# Patient Record
Sex: MALE | Race: WHITE | NOT HISPANIC OR LATINO | Employment: FULL TIME | URBAN - METROPOLITAN AREA
[De-identification: names, ages, dates, MRNs, and addresses within clinical notes are randomized per-mention and may not be internally consistent; named-entity substitution may affect disease eponyms.]

---

## 2018-02-22 ENCOUNTER — OFFICE VISIT (OUTPATIENT)
Dept: INTERNAL MEDICINE | Facility: CLINIC | Age: 48
End: 2018-02-22
Payer: COMMERCIAL

## 2018-02-22 VITALS
TEMPERATURE: 99 F | HEART RATE: 92 BPM | WEIGHT: 200.63 LBS | DIASTOLIC BLOOD PRESSURE: 81 MMHG | SYSTOLIC BLOOD PRESSURE: 131 MMHG | BODY MASS INDEX: 31.49 KG/M2 | HEIGHT: 67 IN | RESPIRATION RATE: 18 BRPM

## 2018-02-22 DIAGNOSIS — B96.89 BACTERIAL INFECTION OF SKIN: ICD-10-CM

## 2018-02-22 DIAGNOSIS — L08.9 BACTERIAL INFECTION OF SKIN: ICD-10-CM

## 2018-02-22 DIAGNOSIS — F17.200 TOBACCO DEPENDENCE: ICD-10-CM

## 2018-02-22 DIAGNOSIS — L30.9 ECZEMA, UNSPECIFIED TYPE: Primary | ICD-10-CM

## 2018-02-22 PROCEDURE — 99999 PR PBB SHADOW E&M-NEW PATIENT-LVL III: CPT | Mod: PBBFAC,,, | Performed by: INTERNAL MEDICINE

## 2018-02-22 PROCEDURE — 99204 OFFICE O/P NEW MOD 45 MIN: CPT | Mod: 25,S$GLB,, | Performed by: INTERNAL MEDICINE

## 2018-02-22 PROCEDURE — 96372 THER/PROPH/DIAG INJ SC/IM: CPT | Mod: S$GLB,,, | Performed by: INTERNAL MEDICINE

## 2018-02-22 PROCEDURE — 3008F BODY MASS INDEX DOCD: CPT | Mod: S$GLB,,, | Performed by: INTERNAL MEDICINE

## 2018-02-22 RX ORDER — TRIAMCINOLONE ACETONIDE 40 MG/ML
40 INJECTION, SUSPENSION INTRA-ARTICULAR; INTRAMUSCULAR
Status: COMPLETED | OUTPATIENT
Start: 2018-02-22 | End: 2018-02-22

## 2018-02-22 RX ORDER — TRIAMCINOLONE ACETONIDE 1 MG/G
CREAM TOPICAL 2 TIMES DAILY
Qty: 454 G | Refills: 0 | Status: SHIPPED | OUTPATIENT
Start: 2018-02-22 | End: 2018-03-13

## 2018-02-22 RX ORDER — PREDNISONE 20 MG/1
TABLET ORAL
Qty: 20 TABLET | Refills: 0 | Status: SHIPPED | OUTPATIENT
Start: 2018-02-22 | End: 2018-03-13

## 2018-02-22 RX ORDER — DOXYCYCLINE 100 MG/1
100 CAPSULE ORAL 2 TIMES DAILY
Qty: 20 CAPSULE | Refills: 0 | Status: SHIPPED | OUTPATIENT
Start: 2018-02-22 | End: 2018-03-13

## 2018-02-22 RX ADMIN — TRIAMCINOLONE ACETONIDE 40 MG: 40 INJECTION, SUSPENSION INTRA-ARTICULAR; INTRAMUSCULAR at 02:02

## 2018-02-22 NOTE — PROGRESS NOTES
Subjective:       Patient ID: Cory Prather is a 48 y.o. male.    Chief Complaint: Eczema    HPI   Pt with chronic eczema is here for evaluation of worsening diffuse rashes on his trunk, arms and legs described as itchy in nature associated with excoriations and mild discharge from a few of the areas. No fevers/chills. He has had some relief in the past with steroids.   Review of Systems   Constitutional: Negative for activity change, appetite change, chills, diaphoresis, fatigue, fever and unexpected weight change.   HENT: Negative for postnasal drip, rhinorrhea, sinus pressure, sneezing, sore throat, trouble swallowing and voice change.    Respiratory: Negative for cough, shortness of breath and wheezing.    Cardiovascular: Negative for chest pain, palpitations and leg swelling.   Gastrointestinal: Negative for abdominal pain, blood in stool, constipation, diarrhea, nausea and vomiting.   Genitourinary: Negative for dysuria.   Musculoskeletal: Negative for arthralgias and myalgias.   Skin: Positive for rash. Negative for wound.   Allergic/Immunologic: Negative for environmental allergies and food allergies.   Hematological: Negative for adenopathy. Does not bruise/bleed easily.       Objective:      Physical Exam   Constitutional: He is oriented to person, place, and time. He appears well-developed and well-nourished. No distress.   HENT:   Head: Normocephalic and atraumatic.   Eyes: Conjunctivae and EOM are normal. Pupils are equal, round, and reactive to light. Right eye exhibits no discharge. Left eye exhibits no discharge. No scleral icterus.   Neck: Normal range of motion. Neck supple. No JVD present.   Cardiovascular: Normal rate, regular rhythm and normal heart sounds.    No murmur heard.  Pulmonary/Chest: Effort normal and breath sounds normal. No respiratory distress. He has no wheezes. He has no rales.   Musculoskeletal: He exhibits no edema.   Lymphadenopathy:     He has no cervical adenopathy.    Neurological: He is alert and oriented to person, place, and time.   Skin: Skin is warm and dry. Rash (with excoriations and scaling/crusting) noted. He is not diaphoretic. There is erythema. No pallor.            Assessment:       1. Eczema, unspecified type    2. Bacterial infection of skin    3. Tobacco dependence        Plan:    1. Kenalog 80 mg IM, Triamcinolone cream BID, Prednisone taper        Referral to Dermatology    2. Rx Doxycycline 100 mg BIDx 10 days 2/2 superimposed bacterial infection from scratching    3. Cessation advised

## 2018-03-13 ENCOUNTER — OFFICE VISIT (OUTPATIENT)
Dept: INTERNAL MEDICINE | Facility: CLINIC | Age: 48
End: 2018-03-13
Payer: COMMERCIAL

## 2018-03-13 VITALS
WEIGHT: 201.06 LBS | HEIGHT: 68 IN | SYSTOLIC BLOOD PRESSURE: 138 MMHG | DIASTOLIC BLOOD PRESSURE: 88 MMHG | HEART RATE: 79 BPM | RESPIRATION RATE: 16 BRPM | BODY MASS INDEX: 30.47 KG/M2 | TEMPERATURE: 98 F

## 2018-03-13 DIAGNOSIS — D56.3 THALASSEMIA MINOR: ICD-10-CM

## 2018-03-13 DIAGNOSIS — Z00.00 ANNUAL PHYSICAL EXAM: Primary | ICD-10-CM

## 2018-03-13 DIAGNOSIS — L30.9 ECZEMA, UNSPECIFIED TYPE: ICD-10-CM

## 2018-03-13 DIAGNOSIS — F17.200 TOBACCO DEPENDENCE: ICD-10-CM

## 2018-03-13 DIAGNOSIS — E66.9 OBESITY (BMI 30-39.9): ICD-10-CM

## 2018-03-13 PROCEDURE — 99999 PR PBB SHADOW E&M-EST. PATIENT-LVL III: CPT | Mod: PBBFAC,,, | Performed by: INTERNAL MEDICINE

## 2018-03-13 PROCEDURE — 90670 PCV13 VACCINE IM: CPT | Mod: S$GLB,,, | Performed by: INTERNAL MEDICINE

## 2018-03-13 PROCEDURE — 99396 PREV VISIT EST AGE 40-64: CPT | Mod: 25,S$GLB,, | Performed by: INTERNAL MEDICINE

## 2018-03-13 PROCEDURE — 90471 IMMUNIZATION ADMIN: CPT | Mod: S$GLB,,, | Performed by: INTERNAL MEDICINE

## 2018-03-13 NOTE — PROGRESS NOTES
Subjective:       Patient ID: Cory Prather is a 48 y.o. male.    Chief Complaint: Annual Exam    HPI   48 y.o. Male here for annual exam.     Cholesterol: (needs)  Vaccines: Influenza (declined); Tetanus (declined)  Eye exam: declined    Exercise: no  Diet: regular    Past Medical History:  No date: Eczema  No date: Obesity (BMI 30-39.9)  No date: Tobacco use  No date: Thalassemia minor  Past Surgical History:  No date: ADENOIDECTOMY  No date: APPENDECTOMY  No date: TONSILLECTOMY  Social History    Marital status: Single              Spouse name:                       Years of education:                 Number of children:               Occupational History  Occupation          Employer            Comment               [email or web address]                             Social History Main Topics    Smoking status: Current Every Day Smoker                                                     Packs/day: 0.00      Years: 0.00           Types: Cigarettes    Smokeless tobacco: Never Used                        Alcohol use: Yes                Comment: social     Drug use: No              Sexual activity: Not Currently     Partners with: Female    Review of patient's allergies indicates:   -- Penicillins -- Rash  Mr. Prather had no medications administered during this visit.    Review of Systems   Constitutional: Negative for activity change, appetite change, chills, diaphoresis, fatigue, fever and unexpected weight change.   HENT: Negative for congestion, mouth sores, postnasal drip, rhinorrhea, sinus pressure, sneezing, sore throat, trouble swallowing and voice change.    Eyes: Negative for discharge, itching and visual disturbance.   Respiratory: Negative for cough, chest tightness, shortness of breath and wheezing.    Cardiovascular: Negative for chest pain, palpitations and leg swelling.   Gastrointestinal: Negative for abdominal pain, blood in stool, constipation, diarrhea, nausea and vomiting.   Endocrine: Negative for  cold intolerance and heat intolerance.   Genitourinary: Negative for difficulty urinating, dysuria, flank pain, hematuria and urgency.   Musculoskeletal: Negative for arthralgias, back pain, myalgias and neck pain.   Skin: Negative for rash and wound.   Allergic/Immunologic: Negative for environmental allergies and food allergies.   Neurological: Negative for dizziness, tremors, seizures, syncope, weakness and headaches.   Hematological: Negative for adenopathy. Does not bruise/bleed easily.   Psychiatric/Behavioral: Negative for confusion, sleep disturbance and suicidal ideas. The patient is not nervous/anxious.        Objective:      Physical Exam   Constitutional: He is oriented to person, place, and time. He appears well-developed and well-nourished. No distress.   HENT:   Head: Normocephalic and atraumatic.   Right Ear: External ear normal.   Left Ear: External ear normal.   Nose: Nose normal.   Mouth/Throat: Oropharynx is clear and moist. No oropharyngeal exudate.   Eyes: Conjunctivae and EOM are normal. Pupils are equal, round, and reactive to light. Right eye exhibits no discharge. Left eye exhibits no discharge. No scleral icterus.   Neck: Normal range of motion. Neck supple. No JVD present. No thyromegaly present.   Cardiovascular: Normal rate, regular rhythm, normal heart sounds and intact distal pulses.    No murmur heard.  Pulmonary/Chest: Effort normal and breath sounds normal. No respiratory distress. He has no wheezes. He has no rales.   Abdominal: Soft. Bowel sounds are normal. He exhibits no distension. There is no tenderness. There is no guarding.   Musculoskeletal: He exhibits no edema.   Lymphadenopathy:     He has no cervical adenopathy.   Neurological: He is alert and oriented to person, place, and time. No cranial nerve deficit. Coordination normal.   Skin: Skin is warm and dry. No rash noted. He is not diaphoretic. No pallor.   Psychiatric: He has a normal mood and affect. Judgment normal.        Assessment:       1. Annual physical exam    2. Tobacco dependence    3. Obesity (BMI 30-39.9)    4. Eczema, unspecified type    5. Thalassemia minor        Plan:    1. Complete blood work, UA       Vaccines: Influenza (declined); Tetanus (declined), PNA (2018)       Eye exam: declined   2. Tobacco use- cessation advised   3. Obesity- pt advised on proper diet/exercise for weight loss   4. Eczema- significant improvement with PO/topical steroids       Pt will f/u with Derm    5. Thalassemia minor   6. F/u in 1 yr

## 2018-03-14 ENCOUNTER — LAB VISIT (OUTPATIENT)
Dept: LAB | Facility: HOSPITAL | Age: 48
End: 2018-03-14
Attending: INTERNAL MEDICINE
Payer: COMMERCIAL

## 2018-03-14 DIAGNOSIS — Z00.00 ANNUAL PHYSICAL EXAM: ICD-10-CM

## 2018-03-14 LAB
ALBUMIN SERPL BCP-MCNC: 3.6 G/DL
ALP SERPL-CCNC: 83 U/L
ALT SERPL W/O P-5'-P-CCNC: 16 U/L
ANION GAP SERPL CALC-SCNC: 8 MMOL/L
AST SERPL-CCNC: 14 U/L
BASOPHILS # BLD AUTO: 0.04 K/UL
BASOPHILS NFR BLD: 0.4 %
BILIRUB SERPL-MCNC: 1.1 MG/DL
BUN SERPL-MCNC: 15 MG/DL
CALCIUM SERPL-MCNC: 9.4 MG/DL
CHLORIDE SERPL-SCNC: 104 MMOL/L
CHOLEST SERPL-MCNC: 209 MG/DL
CHOLEST/HDLC SERPL: 4.9 {RATIO}
CO2 SERPL-SCNC: 26 MMOL/L
COMPLEXED PSA SERPL-MCNC: 0.59 NG/ML
CREAT SERPL-MCNC: 0.8 MG/DL
DIFFERENTIAL METHOD: ABNORMAL
EOSINOPHIL # BLD AUTO: 0.1 K/UL
EOSINOPHIL NFR BLD: 1.1 %
ERYTHROCYTE [DISTWIDTH] IN BLOOD BY AUTOMATED COUNT: 18.7 %
EST. GFR  (AFRICAN AMERICAN): >60 ML/MIN/1.73 M^2
EST. GFR  (NON AFRICAN AMERICAN): >60 ML/MIN/1.73 M^2
ESTIMATED AVG GLUCOSE: 94 MG/DL
GLUCOSE SERPL-MCNC: 97 MG/DL
HBA1C MFR BLD HPLC: 4.9 %
HCT VFR BLD AUTO: 42.8 %
HDLC SERPL-MCNC: 43 MG/DL
HDLC SERPL: 20.6 %
HGB BLD-MCNC: 13.2 G/DL
IMM GRANULOCYTES # BLD AUTO: 0.06 K/UL
IMM GRANULOCYTES NFR BLD AUTO: 0.5 %
LDLC SERPL CALC-MCNC: 148.6 MG/DL
LYMPHOCYTES # BLD AUTO: 2.3 K/UL
LYMPHOCYTES NFR BLD: 20.3 %
MCH RBC QN AUTO: 20.2 PG
MCHC RBC AUTO-ENTMCNC: 30.8 G/DL
MCV RBC AUTO: 65 FL
MONOCYTES # BLD AUTO: 1 K/UL
MONOCYTES NFR BLD: 8.6 %
NEUTROPHILS # BLD AUTO: 7.9 K/UL
NEUTROPHILS NFR BLD: 69.1 %
NONHDLC SERPL-MCNC: 166 MG/DL
NRBC BLD-RTO: 0 /100 WBC
PLATELET # BLD AUTO: 253 K/UL
PMV BLD AUTO: 10.4 FL
POTASSIUM SERPL-SCNC: 4.6 MMOL/L
PROT SERPL-MCNC: 7.3 G/DL
RBC # BLD AUTO: 6.55 M/UL
SODIUM SERPL-SCNC: 138 MMOL/L
TRIGL SERPL-MCNC: 87 MG/DL
TSH SERPL DL<=0.005 MIU/L-ACNC: 1.03 UIU/ML
WBC # BLD AUTO: 11.4 K/UL

## 2018-03-14 PROCEDURE — 84443 ASSAY THYROID STIM HORMONE: CPT

## 2018-03-14 PROCEDURE — 83036 HEMOGLOBIN GLYCOSYLATED A1C: CPT

## 2018-03-14 PROCEDURE — 80053 COMPREHEN METABOLIC PANEL: CPT

## 2018-03-14 PROCEDURE — 36415 COLL VENOUS BLD VENIPUNCTURE: CPT | Mod: PO

## 2018-03-14 PROCEDURE — 85025 COMPLETE CBC W/AUTO DIFF WBC: CPT

## 2018-03-14 PROCEDURE — 80061 LIPID PANEL: CPT

## 2018-03-14 PROCEDURE — 84153 ASSAY OF PSA TOTAL: CPT

## 2018-03-20 ENCOUNTER — INITIAL CONSULT (OUTPATIENT)
Dept: DERMATOLOGY | Facility: CLINIC | Age: 48
End: 2018-03-20
Payer: COMMERCIAL

## 2018-03-20 VITALS — BODY MASS INDEX: 30.56 KG/M2 | WEIGHT: 201 LBS

## 2018-03-20 DIAGNOSIS — L20.84 INTRINSIC ATOPIC DERMATITIS: Primary | ICD-10-CM

## 2018-03-20 PROCEDURE — 99999 PR PBB SHADOW E&M-EST. PATIENT-LVL III: CPT | Mod: PBBFAC,,, | Performed by: DERMATOLOGY

## 2018-03-20 PROCEDURE — 99202 OFFICE O/P NEW SF 15 MIN: CPT | Mod: S$GLB,,, | Performed by: DERMATOLOGY

## 2018-03-20 RX ORDER — FLUOCINONIDE 0.5 MG/G
CREAM TOPICAL 2 TIMES DAILY
COMMUNITY
End: 2018-10-11 | Stop reason: SDUPTHER

## 2018-03-20 NOTE — LETTER
March 20, 2018      Jae Pappas DO  [address] [facility]  [place] [address]           [place] - Dermatology  [address] [facility] [address]  Phone: [phone]  Fax: [phone]          Patient: Cory Prather   MR Number: [id number]   YOB: 1970   Date of Visit: 3/20/2018       Dear Dr. Jae Pappas:    Thank you for referring Cory Prather to me for evaluation. Attached you will find relevant portions of my assessment and plan of care.    If you have questions, please do not hesitate to call me. I look forward to following Cory Prather along with you.    Sincerely,    Jackie Agosto MD    Enclosure  CC:  No Recipients    If you would like to receive this communication electronically, please contact [email or web address] or [phone] to request more information on [employer] [facility] access.    For providers and/or their staff who would like to refer a patient to [facility], please contact us through our one-stop-shop provider referral line, [facility], at [phone].    If you feel you have received this communication in error or would no longer like to receive these types of communications, please e-mail [email or web address]

## 2018-03-20 NOTE — PROGRESS NOTES
"  Subjective:       Patient ID:  Cory Prather is a 48 y.o. male who presents for   Chief Complaint   Patient presents with    Eczema    Dry Skin    Itching     History of Present Illness: The patient presents with chief complaint of rash.  Location: arms and trunk  Duration: off and on for years  Signs/Symptoms: itch    Prior treatments: prednisone and lidex, clear now    2/22:  "Pt with chronic eczema is here for evaluation of worsening diffuse rashes on his trunk, arms and legs described as itchy in nature associated with excoriations and mild discharge from a few of the areas. No fevers/chills. He has had some relief in the past with steroids.    1. Kenalog 80 mg IM, Triamcinolone cream BID, Prednisone taper        Referral to Dermatology    2. Rx Doxycycline 100 mg BIDx 10 days 2/2 superimposed bacterial infection from scratching "          Review of Systems   Constitutional: Negative for fever.   HENT: Negative for congestion, rhinorrhea and postnasal drip.    Eyes: Negative for itching.   Skin: Positive for itching, rash and dry skin.   Allergic/Immunologic: Positive for environmental allergies.        Objective:    Physical Exam   Constitutional: He appears well-developed and well-nourished. No distress.   Neurological: He is alert and oriented to person, place, and time. He is not disoriented.   Psychiatric: He has a normal mood and affect.   Skin:   Areas Examined (abnormalities noted in diagram):   Head / Face Inspection Performed  Neck Inspection Performed  Chest / Axilla Inspection Performed  Back Inspection Performed  RUE Inspected  LUE Inspection Performed              Diagram Legend     Erythematous scaling macule/papule c/w actinic keratosis       Vascular papule c/w angioma      Pigmented verrucoid papule/plaque c/w seborrheic keratosis      Yellow umbilicated papule c/w sebaceous hyperplasia      Irregularly shaped tan macule c/w lentigo     1-2 mm smooth white papules consistent with Milia     "  Movable subcutaneous cyst with punctum c/w epidermal inclusion cyst      Subcutaneous movable cyst c/w pilar cyst      Firm pink to brown papule c/w dermatofibroma      Pedunculated fleshy papule(s) c/w skin tag(s)      Evenly pigmented macule c/w junctional nevus     Mildly variegated pigmented, slightly irregular-bordered macule c/w mildly atypical nevus      Flesh colored to evenly pigmented papule c/w intradermal nevus       Pink pearly papule/plaque c/w basal cell carcinoma      Erythematous hyperkeratotic cursted plaque c/w SCC      Surgical scar with no sign of skin cancer recurrence      Open and closed comedones      Inflammatory papules and pustules      Verrucoid papule consistent consistent with wart     Erythematous eczematous patches and plaques     Dystrophic onycholytic nail with subungual debris c/w onychomycosis     Umbilicated papule    Erythematous-base heme-crusted tan verrucoid plaque consistent with inflamed seborrheic keratosis     Erythematous Silvery Scaling Plaque c/w Psoriasis     See annotation      Assessment / Plan:        Intrinsic atopic dermatitis  Cont lidex prn flares  (see outside records Dr Chen)    cetaphil eczema calming wash and lotion  cerave with pramoxine              Follow-up if symptoms worsen or fail to improve.

## 2018-10-10 ENCOUNTER — TELEPHONE (OUTPATIENT)
Dept: INTERNAL MEDICINE | Facility: CLINIC | Age: 48
End: 2018-10-10

## 2018-10-10 NOTE — TELEPHONE ENCOUNTER
----- Message from Chelo Art sent at 10/9/2018  4:32 PM CDT -----  Contact: Pt's mom Trista   Pt's mom is calling in regards to scheduling an injection appt for eczema for pt.    Pt's mom can be reached at [phone].    Thank you

## 2018-10-11 ENCOUNTER — OFFICE VISIT (OUTPATIENT)
Dept: INTERNAL MEDICINE | Facility: CLINIC | Age: 48
End: 2018-10-11
Payer: COMMERCIAL

## 2018-10-11 VITALS
HEART RATE: 76 BPM | RESPIRATION RATE: 18 BRPM | TEMPERATURE: 99 F | WEIGHT: 196.88 LBS | BODY MASS INDEX: 29.84 KG/M2 | DIASTOLIC BLOOD PRESSURE: 78 MMHG | SYSTOLIC BLOOD PRESSURE: 138 MMHG | HEIGHT: 68 IN

## 2018-10-11 DIAGNOSIS — A49.9 BACTERIAL INFECTION: ICD-10-CM

## 2018-10-11 DIAGNOSIS — L30.9 ECZEMA, UNSPECIFIED TYPE: Primary | ICD-10-CM

## 2018-10-11 DIAGNOSIS — L30.9 ECZEMA, UNSPECIFIED TYPE: ICD-10-CM

## 2018-10-11 PROCEDURE — 99999 PR PBB SHADOW E&M-EST. PATIENT-LVL III: CPT | Mod: PBBFAC,,, | Performed by: INTERNAL MEDICINE

## 2018-10-11 PROCEDURE — 96372 THER/PROPH/DIAG INJ SC/IM: CPT | Mod: S$GLB,,, | Performed by: INTERNAL MEDICINE

## 2018-10-11 PROCEDURE — 99214 OFFICE O/P EST MOD 30 MIN: CPT | Mod: 25,S$GLB,, | Performed by: INTERNAL MEDICINE

## 2018-10-11 PROCEDURE — 3008F BODY MASS INDEX DOCD: CPT | Mod: CPTII,S$GLB,, | Performed by: INTERNAL MEDICINE

## 2018-10-11 RX ORDER — PREDNISONE 20 MG/1
TABLET ORAL
Qty: 20 TABLET | Refills: 0 | Status: SHIPPED | OUTPATIENT
Start: 2018-10-11 | End: 2018-12-11

## 2018-10-11 RX ORDER — DOXYCYCLINE 100 MG/1
100 CAPSULE ORAL 2 TIMES DAILY
Qty: 20 CAPSULE | Refills: 0 | Status: SHIPPED | OUTPATIENT
Start: 2018-10-11 | End: 2018-10-21

## 2018-10-11 RX ORDER — TRIAMCINOLONE ACETONIDE 40 MG/ML
40 INJECTION, SUSPENSION INTRA-ARTICULAR; INTRAMUSCULAR
Status: COMPLETED | OUTPATIENT
Start: 2018-10-11 | End: 2018-10-11

## 2018-10-11 RX ORDER — FLUOCINONIDE 0.5 MG/G
CREAM TOPICAL 2 TIMES DAILY
Qty: 120 G | Refills: 5 | Status: SHIPPED | OUTPATIENT
Start: 2018-10-11 | End: 2021-03-30

## 2018-10-11 RX ADMIN — TRIAMCINOLONE ACETONIDE 40 MG: 40 INJECTION, SUSPENSION INTRA-ARTICULAR; INTRAMUSCULAR at 09:10

## 2018-10-11 NOTE — PROGRESS NOTES
Subjective:       Patient ID: Cory Prather is a 48 y.o. male.    Chief Complaint: Eczema    HPI   Pt here for f/u f/u regarding eczema/dermatitis. His symptoms have been getting worse over the last few months and is diffuse in nature. Minimal relief with steroid cream.   Review of Systems   Constitutional: Negative for activity change, appetite change, chills, diaphoresis, fatigue, fever and unexpected weight change.   HENT: Negative for postnasal drip, rhinorrhea, sinus pressure, sneezing, sore throat, trouble swallowing and voice change.    Respiratory: Negative for cough, shortness of breath and wheezing.    Cardiovascular: Negative for chest pain, palpitations and leg swelling.   Gastrointestinal: Negative for abdominal pain, blood in stool, constipation, diarrhea, nausea and vomiting.   Genitourinary: Negative for dysuria.   Musculoskeletal: Negative for arthralgias and myalgias.   Skin: Positive for rash. Negative for wound.   Allergic/Immunologic: Negative for environmental allergies and food allergies.   Hematological: Negative for adenopathy. Does not bruise/bleed easily.       Objective:      Physical Exam   Constitutional: He is oriented to person, place, and time. He appears well-developed and well-nourished. No distress.   HENT:   Head: Normocephalic and atraumatic.   Eyes: Conjunctivae and EOM are normal. Pupils are equal, round, and reactive to light. Right eye exhibits no discharge. Left eye exhibits no discharge. No scleral icterus.   Neck: Normal range of motion. Neck supple. No JVD present.   Cardiovascular: Normal rate, regular rhythm and normal heart sounds.   No murmur heard.  Pulmonary/Chest: Effort normal and breath sounds normal. No respiratory distress. He has no wheezes. He has no rales.   Musculoskeletal: He exhibits no edema.   Lymphadenopathy:     He has no cervical adenopathy.   Neurological: He is alert and oriented to person, place, and time.   Skin: Skin is warm and dry. Rash  (diffuse, worse on the extremities a/w scaling) noted. Rash is nodular. He is not diaphoretic. There is erythema. No pallor.       Assessment:       1. Eczema, unspecified type    2. Bacterial infection        Plan:    1. Rx Prednisone taper, Triamcinolone cream, Kenalog 80 mg IM        Referral to [facility] for testing    2. Rx Doxycycline

## 2018-10-12 ENCOUNTER — TELEPHONE (OUTPATIENT)
Dept: INTERNAL MEDICINE | Facility: CLINIC | Age: 48
End: 2018-10-12

## 2018-10-12 DIAGNOSIS — L30.9 ECZEMA, UNSPECIFIED TYPE: ICD-10-CM

## 2018-10-12 RX ORDER — TRIAMCINOLONE ACETONIDE 1 MG/G
CREAM TOPICAL
Qty: 454 G | Refills: 0 | Status: SHIPPED | OUTPATIENT
Start: 2018-10-12 | End: 2018-10-12 | Stop reason: SDUPTHER

## 2018-10-12 RX ORDER — TRIAMCINOLONE ACETONIDE 1 MG/G
CREAM TOPICAL
Qty: 800 G | Refills: 0 | Status: SHIPPED | OUTPATIENT
Start: 2018-10-12 | End: 2018-12-11

## 2018-10-12 NOTE — TELEPHONE ENCOUNTER
----- Message from Melony Castro sent at 10/12/2018 11:13 AM CDT -----  Contact: August @ [facility] Direct # [phone],Fax# [phone]  RX request - refill or new RX.  Is this a refill or new RX:  Refill  RX name and strength: triamcinolone acetonide 0.1% (KENALOG) 0.1 % cream  Directions:   Is this a 30 day or 90 day RX:  30  Local pharmacy or mail order pharmacy:  [facility] - [address]  Pharmacy name and phone # Phone # [phone],Fax# [phone]  Comments:  Patient would like to have this medication changed to an 800 gram tube because the script that was requested is unavailable.

## 2018-10-31 ENCOUNTER — OFFICE VISIT (OUTPATIENT)
Dept: ALLERGY | Facility: CLINIC | Age: 48
End: 2018-10-31
Payer: COMMERCIAL

## 2018-10-31 ENCOUNTER — HOSPITAL ENCOUNTER (OUTPATIENT)
Dept: RADIOLOGY | Facility: HOSPITAL | Age: 48
Discharge: HOME | End: 2018-10-31
Attending: ALLERGY & IMMUNOLOGY
Payer: COMMERCIAL

## 2018-10-31 VITALS
DIASTOLIC BLOOD PRESSURE: 76 MMHG | HEART RATE: 74 BPM | HEIGHT: 68 IN | WEIGHT: 192.25 LBS | BODY MASS INDEX: 29.14 KG/M2 | SYSTOLIC BLOOD PRESSURE: 138 MMHG

## 2018-10-31 DIAGNOSIS — R05.9 COUGH: ICD-10-CM

## 2018-10-31 DIAGNOSIS — F17.200 TOBACCO DEPENDENCE: ICD-10-CM

## 2018-10-31 DIAGNOSIS — R05.9 COUGH: Primary | ICD-10-CM

## 2018-10-31 DIAGNOSIS — L30.9 DERMATITIS: ICD-10-CM

## 2018-10-31 PROCEDURE — 71046 X-RAY EXAM CHEST 2 VIEWS: CPT | Mod: 26,,, | Performed by: RADIOLOGY

## 2018-10-31 PROCEDURE — 71046 X-RAY EXAM CHEST 2 VIEWS: CPT | Mod: TC

## 2018-10-31 PROCEDURE — 99244 OFF/OP CNSLTJ NEW/EST MOD 40: CPT | Mod: S$GLB,,, | Performed by: ALLERGY & IMMUNOLOGY

## 2018-10-31 PROCEDURE — 99999 PR PBB SHADOW E&M-EST. PATIENT-LVL III: CPT | Mod: PBBFAC,,, | Performed by: ALLERGY & IMMUNOLOGY

## 2018-10-31 NOTE — LETTER
October 31, 2018      Jae Pappas, DO  [address] [facility] [place] [address]           [facility] - Allergy/ Immunology  [address]  [facility] [address]  Phone: [phone]  Fax: [phone]          Patient: Cory Prather   MR Number: [id number]   YOB: 1970   Date of Visit: 10/31/2018       Dear Dr. Jae Pappas:    Thank you for referring Cory Prather to me for evaluation. Attached you will find relevant portions of my assessment and plan of care.    If you have questions, please do not hesitate to call me. I look forward to following Cory Prather along with you.    Sincerely,    FLORIN Alas III, MD    Enclosure  CC:  No Recipients    If you would like to receive this communication electronically, please contact [email or web address] or [phone] to request more information on [employer] Link access.    For providers and/or their staff who would like to refer a patient to [facility], please contact us through our one-stop-shop provider referral line, [facility], at [phone].    If you feel you have received this communication in error or would no longer like to receive these types of communications, please e-mail [email or web address]

## 2018-10-31 NOTE — PROGRESS NOTES
Cory Prather is referred by Dr. Jae Pappas for a consult regarding a rash.  He is here with his brother Jam.    He has had three episodes of pneumonia.  One was in childhood.  Another was as a teenager.  His third episode was in his 20s.  He thinks he received antibiotics and was not in the hospital.    For the past three years he has had a dermatitis that has been difficult to control.  It is generalized and is on his arms, legs, and chest.  It is erythematous and only slightly pruritic.  He used to be followed by Dr. Migel Chen in [place]atology and then by his daughter  Poornima April.  He remembers having a biopsy done of his leg.  He is not sure with this showed.    He was evaluated by Dr. Jackie Agosto on March 20, 2018.  She diagnosed him with intrinsic atopic dermatitis and gave him Lidex, Cetaphil, and Cerave with pramoxine.  He was told to return there if his dermatitis increased.  He has not scheduled a follow-up yet.    His brother who is with him today had multiple health issues including arthritis.  He was seen in a clinic in [place] and had blood work that showed multiple food allergies and a gluten sensitivity.  He says that this showed at least 36 different foods.  Since he has avoided these foods and all of his symptoms have resolved.  He is convinced that his brother has multiple food allergies as well.    He denies any rhinitis or conjunctivitis.  He denies any history of asthma.      He has smoked 1 1/2 packs of cigarettes a day for 20 years.  He does have an occasional cough that he attributes to smoking.  His brother says that he has noticed mild shortness of breath with exercise.  He does not remember having a chest x-ray.    For ROS, FH, SH please see Allergy and Asthma Questionnaire dated today.    Some relevant pertinent positives:    Review of Systems:  His brother thinks there is a history of thalassemia in the family.    Family History:  His mother has food allergies.    Home  environment:  He has lived in the same house for the past 10 years.  There was no water damage.  There is no evidence of mold.  There is carpeting in the bedroom.  There are no pets.    Social History:  He is a current smoker.  He does not want a smoking cessation referral.  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  His brother Jam who is with him is 60 years old.    Physical Examination:  General: Well-developed, well-nourished, no acute distress.  Head: No sinus tenderness.  Eyes: Conjunctivae:  No bulbar or palpebral conjunctival injection.  Ears: EAC's clear.  TM's clear.  No pre-auricular nodes.  Nose: Nasal Mucosa:  Pink.  Septum: No apparent deviation.  Turbinates:  No significant edema.  Polyps/Mass:  None visible.  Teeth/Gums:  No bleeding noted.  Oropharynx: No exudates.  Neck: Supple without thyromegaly. No cervical lymphadenopathy.    Respiratory/Chest: Effort: Good.  Auscultation:  Clear bilaterally.  Cardiovascular:  No murmur, rubs, or gallop heard.   GI:  Non-tender.  No masses.  No organomegaly.  Extremities:  No swelling.  No cyanosis, clubbing, or edema.  Skin: Good turgor.  No urticaria or angioedema.  There is an erythematous scaling rash across the arms, legs, and chest.  Neuro/Psych: Oriented x 3.    Assessment:  1.  Chronic dermatitis of uncertain etiology.  2.  Doubt food allergy.  3.  Current smoker.  4.  Probable thalassemia minor.    Recommendations:  1.  Laboratory as ordered.  2.  Chest x-ray.  3.  Dermatology follow-up.  4.  Consider food allergy tests in the future.

## 2018-11-14 ENCOUNTER — OFFICE VISIT (OUTPATIENT)
Dept: ALLERGY | Facility: CLINIC | Age: 48
End: 2018-11-14
Payer: COMMERCIAL

## 2018-11-14 VITALS — WEIGHT: 190.69 LBS | HEIGHT: 68 IN | BODY MASS INDEX: 28.9 KG/M2 | TEMPERATURE: 97 F

## 2018-11-14 DIAGNOSIS — J30.9 ALLERGIC RHINITIS, UNSPECIFIED SEASONALITY, UNSPECIFIED TRIGGER: ICD-10-CM

## 2018-11-14 DIAGNOSIS — F17.200 TOBACCO DEPENDENCE: ICD-10-CM

## 2018-11-14 DIAGNOSIS — L30.8 OTHER ECZEMA: Primary | ICD-10-CM

## 2018-11-14 DIAGNOSIS — D56.3 THALASSEMIA MINOR: ICD-10-CM

## 2018-11-14 PROCEDURE — 3008F BODY MASS INDEX DOCD: CPT | Mod: CPTII,S$GLB,, | Performed by: ALLERGY & IMMUNOLOGY

## 2018-11-14 PROCEDURE — 99999 PR PBB SHADOW E&M-EST. PATIENT-LVL II: CPT | Mod: PBBFAC,,, | Performed by: ALLERGY & IMMUNOLOGY

## 2018-11-14 PROCEDURE — 99214 OFFICE O/P EST MOD 30 MIN: CPT | Mod: S$GLB,,, | Performed by: ALLERGY & IMMUNOLOGY

## 2018-11-14 NOTE — PROGRESS NOTES
Cory Prather returns to clinic today for continued evaluation of chronic dermatitis.  He is here alone.  His brother is in the car in the parking lot on a phone call.    After his last visit, he said that his skin did improve however over the last several days he has again experiencing a worsening of his rash particularly on both legs.  He does have dermatology follow-up soon.    He has had this sensation that something is in the right eye for the last three days.  It has been red and tearing.  He does think that it is improving.    He has also been having some mild rhinitis with sneezing.    He continues to smoke.    He has had three episodes of pneumonia one in childhood, one as a teenager, and one in his 20s.  He has not had any recently.    He denies any cough, wheezing, or shortness of breath.    [facility] PEQ ALLERGY QUESTIONNAIRE SHORT 11/14/2018   Are you taking any new medications since your last visit? No   Constitution: No changes since my last visit with this provider   Head or facial pain: No changes since my last visit with this provider   Eyes: No symptoms   Ears: No symptoms   Nose: No symptoms   Throat: Sore throat, Hoarseness   Sinuses: No symptoms   Lungs: No symptoms   Skin: Itching, Redness   Cardiovascular: No symptoms   Gastrointestinal: No symptoms   Genital/ urinary No symptoms   Musculoskeletal: No symptoms   Neurologic: No symptoms   Endocrine: No symptoms   Hematologic: No symptoms     Physical Examination:  General: Well-developed, well-nourished, no acute distress.  Head: No sinus tenderness.  Eyes: Conjunctivae:  Right conjunctiva injection.  Ears: EAC's clear.  TM's clear.  No pre-auricular nodes.  Nose: Nasal Mucosa:  Pink.  Septum: No apparent deviation.  Turbinates:  No significant edema.  Polyps/Mass:  None visible.  Teeth/Gums:  No bleeding noted.  Oropharynx: No exudates.  Neck: Supple without thyromegaly. No cervical lymphadenopathy.    Respiratory/Chest: Effort: Good.   Auscultation:  Clear bilaterally.  Skin: Good turgor.  No urticaria or angioedema.  Erythematous scaly rash on both lower extremities and in the flexural creases of the left arm.  Neuro/Psych: Oriented x 3.    Outside medical records were reviewed and scanned into [facility] on 2018.    Laboratory 10/31/2018:  IgE level:  672.  ImmunoCAP:  Class IV:  D.f.   Class III:  D.pt.  Class II:  [place].  Class I:  Cockroach.  IgA:  167.  Ig.  IgM:  49.  Pneumococcal titers:  Borderline protective.  CBC:  Hemoglobin 12.7, hematocrit 40.3.  Microcytic indices.  Hemoglobin electrophoresis:  Beta thalassemia minor.  Celiac panel:  Negative.    Chest x-ray:  No acute abnormality.    Assessment:  1.  Chronic dermatitis of uncertain etiology.  2.  Mild allergic rhinitis.  3.  Acute conjunctivitis, probable mild abrasion.  4.  Current smoker:  5.  Beta thalassemia minor.    Recommendations:  1.  House dust mite avoidance.  2.  OTC antihistamines as needed.  3.  Ophthalmology appointment discussed if right eye symptoms continue.  4.  Dermatology follow-up.  5.  Quit smoking.  6.  Pneumovax.  He would rather not have today.  7.  Dermatology follow-up  8.  Return to clinic as needed.    House dust mite avoidance was reviewed.  Handouts were given.

## 2018-11-17 ENCOUNTER — OFFICE VISIT (OUTPATIENT)
Dept: URGENT CARE | Facility: CLINIC | Age: 48
End: 2018-11-17
Payer: COMMERCIAL

## 2018-11-17 VITALS
SYSTOLIC BLOOD PRESSURE: 141 MMHG | HEART RATE: 66 BPM | WEIGHT: 190 LBS | OXYGEN SATURATION: 98 % | RESPIRATION RATE: 19 BRPM | HEIGHT: 67 IN | BODY MASS INDEX: 29.82 KG/M2 | TEMPERATURE: 98 F | DIASTOLIC BLOOD PRESSURE: 81 MMHG

## 2018-11-17 DIAGNOSIS — H10.9 CONJUNCTIVITIS OF RIGHT EYE, UNSPECIFIED CONJUNCTIVITIS TYPE: Primary | ICD-10-CM

## 2018-11-17 PROCEDURE — 3008F BODY MASS INDEX DOCD: CPT | Mod: CPTII,S$GLB,, | Performed by: FAMILY MEDICINE

## 2018-11-17 PROCEDURE — 99214 OFFICE O/P EST MOD 30 MIN: CPT | Mod: S$GLB,,, | Performed by: FAMILY MEDICINE

## 2018-11-17 RX ORDER — TOBRAMYCIN AND DEXAMETHASONE 3; 1 MG/ML; MG/ML
1 SUSPENSION/ DROPS OPHTHALMIC
Qty: 1 BOTTLE | Refills: 0 | Status: SHIPPED | OUTPATIENT
Start: 2018-11-17 | End: 2018-11-27

## 2018-11-17 NOTE — PATIENT INSTRUCTIONS
Conjunctivitis Caused by Infection     Wash hands often to help prevent spreading infection.     Infections are caused by viruses or germs (bacteria). Treatment includes keeping your eyes and hands clean. Your healthcare provider may prescribe eye drops, and tell you to stay home from work or school if youre contagious. Untreated infections can be serious. It's important to see your provider for a diagnosis.  Viral infections  A cold, flu, or other virus can spread to your eyes. This causes a watery discharge. Your eyes may burn or itch and get red. Your eyelids may also be puffy and sore.  Treatment  Most viral infections go away on their own. Artificial tears and warm compresses can relieve symptoms. Your provider may also prescribe eye drops. A viral infection can be very contagious and spreads quickly. To prevent this, wash your hands often. Use a separate tissue to wipe each eye. Dont touch your eyes or share bedding or towels.   Bacterial infections  Bacterial infections often occur in one eye. There may be a watery or a thick discharge from the eye. These infections can cause serious damage to your eye if not treated promptly.  Treatment  Your provider may prescribe eye drops or ointment to kill the bacteria. Warm compresses can help keep the eyelids clean. To keep the bacteria from spreading, wash your hands often. Use a separate tissue to wipe each eye. Dont touch your eyes or share bedding or towels.  Date Last Reviewed: 6/11/2015  © 6870-0279 [employer]. [address] [place] [address], [place] [address]. All rights reserved. This information is not intended as a substitute for professional medical care. Always follow your healthcare professional's instructions.      Follow up with your doctor in a few days as needed.  Return to the urgent care or go to the ER if symptoms get worse.    Jason Khan MD

## 2018-11-17 NOTE — PROGRESS NOTES
"Subjective:       Patient ID: Cory Prather is a 48 y.o. male.    Vitals:  height is 5' 7" (1.702 m) and weight is 86.2 kg (190 lb). His oral temperature is 97.6 °F (36.4 °C). His blood pressure is 141/81 (abnormal) and his pulse is 66. His respiration is 19 and oxygen saturation is 98%.     Chief Complaint: Conjunctivitis (R eye redness, discharge)    Conjunctivitis   This is a new problem. Episode onset: 3-4 days ago. The problem occurs constantly. The problem has been unchanged. Pertinent negatives include no chills, congestion, fever, headaches, nausea, rash or vomiting. Nothing aggravates the symptoms. He has tried nothing for the symptoms.       Constitution: Negative for chills and fever.   HENT: Negative for congestion and sinus pain.    Eyes: Positive for eye discharge and eye redness. Negative for eye trauma, foreign body in eye, eye itching, eye pain, photophobia, vision loss, double vision, blurred vision and eyelid swelling.   Gastrointestinal: Negative for nausea and vomiting.   Skin: Negative for rash.   Allergic/Immunologic: Negative for seasonal allergies and itching.   Neurological: Negative for headaches.       Objective:      Physical Exam   Constitutional: He is oriented to person, place, and time. He appears well-developed and well-nourished.   HENT:   Head: Normocephalic and atraumatic.   Right Ear: External ear normal.   Left Ear: External ear normal.   Mouth/Throat: Oropharynx is clear and moist.   Right eye conjunctiva redness, no discharge.   Eyes: Conjunctivae, EOM and lids are normal. Pupils are equal, round, and reactive to light.   Neck: Trachea normal, full passive range of motion without pain and phonation normal. Neck supple.   Musculoskeletal: Normal range of motion.   Neurological: He is alert and oriented to person, place, and time.   Skin: Skin is warm, dry and intact.   Psychiatric: He has a normal mood and affect. His speech is normal and behavior is normal. Judgment and " thought content normal. Cognition and memory are normal.   Nursing note and vitals reviewed.      Assessment:       1. Conjunctivitis of right eye, unspecified conjunctivitis type        Plan:         Conjunctivitis of right eye, unspecified conjunctivitis type  -     tobramycin-dexamethasone 0.3-0.1% (TOBRADEX) 0.3-0.1 % DrpS; Place 1 drop into the left eye every 4 (four) hours while awake. for 10 days  Dispense: 1 Bottle; Refill: 0          Patient Instructions       Conjunctivitis Caused by Infection     Wash hands often to help prevent spreading infection.     Infections are caused by viruses or germs (bacteria). Treatment includes keeping your eyes and hands clean. Your healthcare provider may prescribe eye drops, and tell you to stay home from work or school if youre contagious. Untreated infections can be serious. It's important to see your provider for a diagnosis.  Viral infections  A cold, flu, or other virus can spread to your eyes. This causes a watery discharge. Your eyes may burn or itch and get red. Your eyelids may also be puffy and sore.  Treatment  Most viral infections go away on their own. Artificial tears and warm compresses can relieve symptoms. Your provider may also prescribe eye drops. A viral infection can be very contagious and spreads quickly. To prevent this, wash your hands often. Use a separate tissue to wipe each eye. Dont touch your eyes or share bedding or towels.   Bacterial infections  Bacterial infections often occur in one eye. There may be a watery or a thick discharge from the eye. These infections can cause serious damage to your eye if not treated promptly.  Treatment  Your provider may prescribe eye drops or ointment to kill the bacteria. Warm compresses can help keep the eyelids clean. To keep the bacteria from spreading, wash your hands often. Use a separate tissue to wipe each eye. Dont touch your eyes or share bedding or towels.  Date Last Reviewed: 6/11/2015  ©  8405-9000 [employer]. [address] [place] [address], [place] [address]. All rights reserved. This information is not intended as a substitute for professional medical care. Always follow your healthcare professional's instructions.      Follow up with your doctor in a few days as needed.  Return to the urgent care or go to the ER if symptoms get worse.    Jason Khan MD

## 2018-12-11 ENCOUNTER — OFFICE VISIT (OUTPATIENT)
Dept: DERMATOLOGY | Facility: CLINIC | Age: 48
End: 2018-12-11
Payer: COMMERCIAL

## 2018-12-11 VITALS — BODY MASS INDEX: 29.76 KG/M2 | WEIGHT: 190 LBS

## 2018-12-11 DIAGNOSIS — L30.9 DERMATITIS: Primary | ICD-10-CM

## 2018-12-11 PROCEDURE — 3008F BODY MASS INDEX DOCD: CPT | Mod: CPTII,S$GLB,, | Performed by: DERMATOLOGY

## 2018-12-11 PROCEDURE — 99213 OFFICE O/P EST LOW 20 MIN: CPT | Mod: S$GLB,,, | Performed by: DERMATOLOGY

## 2018-12-11 PROCEDURE — 99999 PR PBB SHADOW E&M-EST. PATIENT-LVL III: CPT | Mod: PBBFAC,,, | Performed by: DERMATOLOGY

## 2018-12-11 RX ORDER — PREDNISONE 20 MG/1
20 TABLET ORAL DAILY
Qty: 15 TABLET | Refills: 0 | Status: SHIPPED | OUTPATIENT
Start: 2018-12-11 | End: 2018-12-26

## 2018-12-11 RX ORDER — TRIAMCINOLONE ACETONIDE 1 MG/G
CREAM TOPICAL
Qty: 454 G | Refills: 3 | Status: SHIPPED | OUTPATIENT
Start: 2018-12-11 | End: 2020-11-21 | Stop reason: SDUPTHER

## 2018-12-11 NOTE — PATIENT INSTRUCTIONS
Managing Atopic Dermatitis (Eczema)     After bathing, gently pat your skin dry (dont rub). Apply moisturizer while your skin is still damp.   To manage your symptoms and help reduce the severity and frequency, try these self-care tips:  Caring for your skin  · Use a gentle, fragrance-free cleanser (or nonsoap cleanser) for bathing. Rinse well. Pat skin dry.  · Take warm, not hot, baths or showers. Try to limit them to no more that 10 to 15 minutes.   · Use moisturizer liberally right after you bathe, while your skin is still damp.  · Avoid scratching because it will cause more damage to your skin.   · Topical, over-the-counter hydrocortisone cream may help control mild symptoms.   Controlling your environment  · Avoid extreme heat or cold.  · Avoid very humid or very dry air.  · If your home or office air is very dry, use a humidifier.  · Avoid allergens, such as dust, that may be present in bedding, carpets, plush toys, or rugs.  · Know that pet hair and dander can cause flare-ups.  Seeking medical treatment  Another way to keep symptoms under control is to seek medical treatment. Talk with your healthcare provider about the type of treatment that may work best for you. Your provider may prescribe treatments such as the following:  · Topical treatments to put on the skin daily  · Medicines taken by mouth (oral medicines), such as antihistamines, antibiotics, or corticosteroids  · In severe cases shots (injections) may be needed to control the symptoms. You may even need antibiotics if skin infections occur.  Treatments dont work the same way for every person. So if your symptoms continue or get worse, ask your healthcare provider about other treatments.  Making lifestyle choices  · Manage the stress in your life.  · Wear loose-fitting cotton clothing that does not bind or rub your skin.  · Avoid contact with wool or other scratchy fabrics.  · Use fragrance-free products.  Getting good results  Now that you  know more about atopic dermatitis, the next step is up to you. Follow your healthcare providers treatment plan and your self-care routine. This will help bring atopic dermatitis under control. If your symptoms persist, be sure to let your health care provider know.   Date Last Reviewed: 2/1/2017 © 2000-2017 [employer]. [address] [place] [address], [place] [address]. All rights reserved. This information is not intended as a substitute for professional medical care. Always follow your healthcare professional's instructions.        What is Atopic Dermatitis?  Atopic dermatitis (also called eczema) causes chronic skin irritation. It is often found in infants, teens, and adults. This disease often runs in families (is genetic). It may also be linked to allergies, such as hay fever and sometimes asthma. Patches of skin become dry, red, itchy, and scaly. In older adults, abnormally dry skin is often called xerosis. Sometimes eczema is only on the hands or feet. It often improves when the skin is well hydrated. It gets worse when the skin is dry. You can help control symptoms by practicing good self-care. Avoid anything that causes flare-ups (such as sunburn or vigorous scratching).  Where do you have symptoms?  Atopic dermatitis symptoms can appear anywhere on the body. But in most cases they vary based on the persons age. In infants, irritation is often seen on the cheeks, chin, near the mouth, and under the eyelids. In children ages 2 through 10, skin folds, such as the backs of the knees, or in the arm crease, are most often affected. In children 11 and older and in adults, symptoms can affect many areas.  What triggers symptoms?  Symptoms flare because of many things. These include skin dryness, scratching, stress, harsh soaps, and irritants such as dust or wool. Try to avoid anything that causes flare-ups.  Recognizing what causes flare-ups  To figure out what causes atopic dermatitis to flare, keep a  list of things that seem to affect your skin. Start by filling in the spaces below. Then keep writing them down in a notebook or diary. The things that affect each person vary. So keep your own list and try to avoid your triggers.    Date Last Reviewed: 2/1/2017 © 2000-2017 [employer]. [address] [place] [address], [place] [address]. All rights reserved. This information is not intended as a substitute for professional medical care. Always follow your healthcare professional's instructions.        What is Atopic Dermatitis?  Atopic dermatitis (also called eczema) causes chronic skin irritation. It is often found in infants, teens, and adults. This disease often runs in families (is genetic). It may also be linked to allergies, such as hay fever and sometimes asthma. Patches of skin become dry, red, itchy, and scaly. In older adults, abnormally dry skin is often called xerosis. Sometimes eczema is only on the hands or feet. It often improves when the skin is well hydrated. It gets worse when the skin is dry. You can help control symptoms by practicing good self-care. Avoid anything that causes flare-ups (such as sunburn or vigorous scratching).  Where do you have symptoms?  Atopic dermatitis symptoms can appear anywhere on the body. But in most cases they vary based on the persons age. In infants, irritation is often seen on the cheeks, chin, near the mouth, and under the eyelids. In children ages 2 through 10, skin folds, such as the backs of the knees, or in the arm crease, are most often affected. In children 11 and older and in adults, symptoms can affect many areas.  What triggers symptoms?  Symptoms flare because of many things. These include skin dryness, scratching, stress, harsh soaps, and irritants such as dust or wool. Try to avoid anything that causes flare-ups.  Recognizing what causes flare-ups  To figure out what causes atopic dermatitis to flare, keep a list of things that seem to affect  your skin. Start by filling in the spaces below. Then keep writing them down in a notebook or diary. The things that affect each person vary. So keep your own list and try to avoid your triggers.    Date Last Reviewed: 2/1/2017  © 3768-5940 [employer]. [address] [place] [address]. All rights reserved. This information is not intended as a substitute for professional medical care. Always follow your healthcare professional's instructions.

## 2018-12-11 NOTE — PROGRESS NOTES
"  Subjective:       Patient ID:  Cory Prather is a 48 y.o. male who presents for   Chief Complaint   Patient presents with    Follow-up    Lesion     lower legs      10/31:"Cory Prather is referred by Dr. Jae Pappas for a consult regarding a rash.  He is here with his brother Jam.     He has had three episodes of pneumonia.  One was in childhood.  Another was as a teenager.  His third episode was in his 20s.  He thinks he received antibiotics and was not in the hospital.     For the past three years he has had a dermatitis that has been difficult to control.  It is generalized and is on his arms, legs, and chest.  It is erythematous and only slightly pruritic.  He used to be followed by Dr. Migel Chen [place] [facility] and then by his daughter Dr. Poornima Chen.  He remembers having a biopsy done of his leg.  He is not sure with this showed.     He was evaluated by Dr. Jackie Agosto on March 20, 2018.  She diagnosed him with intrinsic atopic dermatitis and gave him Lidex, Cetaphil, and Cerave with pramoxine.  He was told to return there if his dermatitis increased.  He has not scheduled a follow-up yet.     His brother who is with him today had multiple health issues including arthritis.  He was seen in a clinic in [place] and had blood work that showed multiple food allergies and a gluten sensitivity.  He says that this showed at least 36 different foods.  Since he has avoided these foods and all of his symptoms have resolved.  He is convinced that his brother has multiple food allergies as well."    See previous note, his arms have cleared but now with rash on legs and left ears he relates very pruritic scratches at night.  Thinks the lidex cream helps some.         Review of Systems   Constitutional: Negative for fever.   HENT: Negative for congestion, rhinorrhea and postnasal drip.    Eyes: Negative for itching.   Skin: Positive for itching, rash and dry skin.   Allergic/Immunologic: Positive for " environmental allergies.        Objective:    Physical Exam   Constitutional: He appears well-developed and well-nourished. No distress.   Neurological: He is alert and oriented to person, place, and time. He is not disoriented.   Psychiatric: He has a normal mood and affect.   Skin:   Areas Examined (abnormalities noted in diagram):   Head / Face Inspection Performed  Neck Inspection Performed  RUE Inspected  LUE Inspection Performed  RLE Inspected  LLE Inspection Performed                   Diagram Legend     Erythematous scaling macule/papule c/w actinic keratosis       Vascular papule c/w angioma      Pigmented verrucoid papule/plaque c/w seborrheic keratosis      Yellow umbilicated papule c/w sebaceous hyperplasia      Irregularly shaped tan macule c/w lentigo     1-2 mm smooth white papules consistent with Milia      Movable subcutaneous cyst with punctum c/w epidermal inclusion cyst      Subcutaneous movable cyst c/w pilar cyst      Firm pink to brown papule c/w dermatofibroma      Pedunculated fleshy papule(s) c/w skin tag(s)      Evenly pigmented macule c/w junctional nevus     Mildly variegated pigmented, slightly irregular-bordered macule c/w mildly atypical nevus      Flesh colored to evenly pigmented papule c/w intradermal nevus       Pink pearly papule/plaque c/w basal cell carcinoma      Erythematous hyperkeratotic cursted plaque c/w SCC      Surgical scar with no sign of skin cancer recurrence      Open and closed comedones      Inflammatory papules and pustules      Verrucoid papule consistent consistent with wart     Erythematous eczematous patches and plaques     Dystrophic onycholytic nail with subungual debris c/w onychomycosis     Umbilicated papule    Erythematous-base heme-crusted tan verrucoid plaque consistent with inflamed seborrheic keratosis     Erythematous Silvery Scaling Plaque c/w Psoriasis     See annotation      Assessment / Plan:        Dermatitis. atopic  -     predniSONE  (DELTASONE) 20 MG tablet; Take 1 tablet (20 mg total) by mouth once daily. for 15 days  Dispense: 15 tablet; Refill: 0  -     triamcinolone acetonide 0.1% (KENALOG) 0.1 % cream; AAA bid  Dispense: 454 g; Refill: 3  No hot water  Written directions given  Lubriderm lotion after baths  Bleach baths weekly                 Follow-up in about 3 months (around 3/11/2019).

## 2020-03-06 DIAGNOSIS — Z12.11 COLON CANCER SCREENING: ICD-10-CM

## 2020-10-05 ENCOUNTER — PATIENT MESSAGE (OUTPATIENT)
Dept: ADMINISTRATIVE | Facility: HOSPITAL | Age: 50
End: 2020-10-05

## 2020-11-19 NOTE — PROGRESS NOTES
Subjective:       Patient ID: Cory Prather is a 50 y.o. male.    Chief Complaint: Eczema    Patient is a 50 y.o.male who presents today for eczema. It is located on bilateral arms, bilateral shins and is itchy in nature. He has been scratching the area and has a few scabs present as well.    Review of Systems   Constitutional: Negative for appetite change, chills and diaphoresis.   HENT: Negative for congestion, dental problem, drooling, ear discharge, ear pain, hearing loss, mouth sores and tinnitus.    Eyes: Negative for discharge, redness and itching.   Respiratory: Negative for chest tightness, shortness of breath and wheezing.    Cardiovascular: Negative for chest pain, palpitations and leg swelling.   Gastrointestinal: Negative for abdominal pain, constipation, diarrhea, nausea and vomiting.   Endocrine: Negative for cold intolerance and heat intolerance.   Genitourinary: Negative for decreased urine volume, difficulty urinating, flank pain and hematuria.   Musculoskeletal: Negative for arthralgias, gait problem and myalgias.   Skin: Positive for rash. Negative for color change.   Allergic/Immunologic: Negative for environmental allergies.   Neurological: Negative for dizziness, syncope and headaches.   Hematological: Negative for adenopathy.   Psychiatric/Behavioral: Negative for agitation, behavioral problems, confusion and sleep disturbance.       Objective:      Physical Exam  Vitals signs and nursing note reviewed.   Constitutional:       General: He is not in acute distress.     Appearance: He is well-developed. He is not diaphoretic.   HENT:      Head: Normocephalic and atraumatic.      Right Ear: External ear normal.      Left Ear: External ear normal.      Nose: Nose normal.      Mouth/Throat:      Pharynx: No oropharyngeal exudate.   Eyes:      General: No scleral icterus.        Right eye: No discharge.         Left eye: No discharge.      Conjunctiva/sclera: Conjunctivae normal.      Pupils:  Pupils are equal, round, and reactive to light.   Neck:      Musculoskeletal: Neck supple.      Thyroid: No thyromegaly.      Vascular: No JVD.      Trachea: No tracheal deviation.   Cardiovascular:      Rate and Rhythm: Normal rate and regular rhythm.      Heart sounds: Normal heart sounds. No murmur. No friction rub. No gallop.    Pulmonary:      Effort: Pulmonary effort is normal. No respiratory distress.      Breath sounds: Normal breath sounds. No stridor. No wheezing or rales.   Chest:      Chest wall: No tenderness.   Abdominal:      General: Bowel sounds are normal. There is no distension.      Palpations: Abdomen is soft.      Tenderness: There is no abdominal tenderness. There is no rebound.   Musculoskeletal:         General: No tenderness.   Lymphadenopathy:      Cervical: No cervical adenopathy.   Skin:     General: Skin is warm and dry.      Findings: No erythema or rash.      Comments: Bilateral erythematous skin with scabs to bilateral arms and lower extremities   Neurological:      Mental Status: He is alert and oriented to person, place, and time.      Cranial Nerves: No cranial nerve deficit.      Deep Tendon Reflexes: Reflexes are normal and symmetric.   Psychiatric:         Behavior: Behavior normal.         Assessment and Plan:       1. Eczema, unspecified type  Notify clinic if symptoms change, worsen or do not improve  - triamcinolone acetonide injection 40 mg  - methylPREDNISolone (MEDROL DOSEPACK) 4 mg tablet; use as directed  Dispense: 1 Package; Refill: 0  - doxycycline (VIBRAMYCIN) 100 MG Cap; Take 1 capsule (100 mg total) by mouth 2 (two) times daily. for 7 days  Dispense: 14 capsule; Refill: 0  - triamcinolone acetonide 0.1% (KENALOG) 0.1 % cream; AAA bid  Dispense: 454 g; Refill: 3          No follow-ups on file.

## 2020-11-21 ENCOUNTER — OFFICE VISIT (OUTPATIENT)
Dept: INTERNAL MEDICINE | Facility: CLINIC | Age: 50
End: 2020-11-21
Payer: COMMERCIAL

## 2020-11-21 VITALS
SYSTOLIC BLOOD PRESSURE: 130 MMHG | HEIGHT: 68 IN | DIASTOLIC BLOOD PRESSURE: 76 MMHG | WEIGHT: 213.38 LBS | BODY MASS INDEX: 32.34 KG/M2 | RESPIRATION RATE: 16 BRPM | TEMPERATURE: 98 F | HEART RATE: 76 BPM

## 2020-11-21 DIAGNOSIS — L30.9 ECZEMA, UNSPECIFIED TYPE: Primary | ICD-10-CM

## 2020-11-21 PROCEDURE — 1126F PR PAIN SEVERITY QUANTIFIED, NO PAIN PRESENT: ICD-10-PCS | Mod: S$GLB,,, | Performed by: INTERNAL MEDICINE

## 2020-11-21 PROCEDURE — 99999 PR PBB SHADOW E&M-EST. PATIENT-LVL III: ICD-10-PCS | Mod: PBBFAC,,, | Performed by: INTERNAL MEDICINE

## 2020-11-21 PROCEDURE — 1126F AMNT PAIN NOTED NONE PRSNT: CPT | Mod: S$GLB,,, | Performed by: INTERNAL MEDICINE

## 2020-11-21 PROCEDURE — 3008F PR BODY MASS INDEX (BMI) DOCUMENTED: ICD-10-PCS | Mod: CPTII,S$GLB,, | Performed by: INTERNAL MEDICINE

## 2020-11-21 PROCEDURE — 99214 OFFICE O/P EST MOD 30 MIN: CPT | Mod: 25,S$GLB,, | Performed by: INTERNAL MEDICINE

## 2020-11-21 PROCEDURE — 96372 PR INJECTION,THERAP/PROPH/DIAG2ST, IM OR SUBCUT: ICD-10-PCS | Mod: S$GLB,,, | Performed by: INTERNAL MEDICINE

## 2020-11-21 PROCEDURE — 99999 PR PBB SHADOW E&M-EST. PATIENT-LVL III: CPT | Mod: PBBFAC,,, | Performed by: INTERNAL MEDICINE

## 2020-11-21 PROCEDURE — 3008F BODY MASS INDEX DOCD: CPT | Mod: CPTII,S$GLB,, | Performed by: INTERNAL MEDICINE

## 2020-11-21 PROCEDURE — 96372 THER/PROPH/DIAG INJ SC/IM: CPT | Mod: S$GLB,,, | Performed by: INTERNAL MEDICINE

## 2020-11-21 PROCEDURE — 99214 PR OFFICE/OUTPT VISIT, EST, LEVL IV, 30-39 MIN: ICD-10-PCS | Mod: 25,S$GLB,, | Performed by: INTERNAL MEDICINE

## 2020-11-21 RX ORDER — DOXYCYCLINE 100 MG/1
100 CAPSULE ORAL 2 TIMES DAILY
Qty: 14 CAPSULE | Refills: 0 | Status: SHIPPED | OUTPATIENT
Start: 2020-11-21 | End: 2020-11-28

## 2020-11-21 RX ORDER — METHYLPREDNISOLONE 4 MG/1
TABLET ORAL
Qty: 1 PACKAGE | Refills: 0 | Status: SHIPPED | OUTPATIENT
Start: 2020-11-21 | End: 2021-03-30 | Stop reason: ALTCHOICE

## 2020-11-21 RX ORDER — TRIAMCINOLONE ACETONIDE 40 MG/ML
40 INJECTION, SUSPENSION INTRA-ARTICULAR; INTRAMUSCULAR
Status: COMPLETED | OUTPATIENT
Start: 2020-11-21 | End: 2020-11-21

## 2020-11-21 RX ORDER — TRIAMCINOLONE ACETONIDE 1 MG/G
CREAM TOPICAL
Qty: 454 G | Refills: 3 | Status: SHIPPED | OUTPATIENT
Start: 2020-11-21 | End: 2021-03-30 | Stop reason: SDUPTHER

## 2020-11-21 RX ADMIN — TRIAMCINOLONE ACETONIDE 40 MG: 40 INJECTION, SUSPENSION INTRA-ARTICULAR; INTRAMUSCULAR at 08:11

## 2021-01-04 ENCOUNTER — PATIENT MESSAGE (OUTPATIENT)
Dept: ADMINISTRATIVE | Facility: HOSPITAL | Age: 51
End: 2021-01-04

## 2021-03-30 ENCOUNTER — OFFICE VISIT (OUTPATIENT)
Dept: INTERNAL MEDICINE | Facility: CLINIC | Age: 51
End: 2021-03-30
Payer: COMMERCIAL

## 2021-03-30 VITALS
RESPIRATION RATE: 20 BRPM | TEMPERATURE: 97 F | BODY MASS INDEX: 32.28 KG/M2 | WEIGHT: 205.69 LBS | HEIGHT: 67 IN | SYSTOLIC BLOOD PRESSURE: 158 MMHG | DIASTOLIC BLOOD PRESSURE: 96 MMHG | HEART RATE: 60 BPM

## 2021-03-30 DIAGNOSIS — R21 RASH: Primary | ICD-10-CM

## 2021-03-30 DIAGNOSIS — L30.9 ECZEMA, UNSPECIFIED TYPE: ICD-10-CM

## 2021-03-30 DIAGNOSIS — R03.0 TRANSIENT ELEVATED BLOOD PRESSURE: ICD-10-CM

## 2021-03-30 PROCEDURE — 99214 PR OFFICE/OUTPT VISIT, EST, LEVL IV, 30-39 MIN: ICD-10-PCS | Mod: 25,S$GLB,, | Performed by: INTERNAL MEDICINE

## 2021-03-30 PROCEDURE — 99999 PR PBB SHADOW E&M-EST. PATIENT-LVL III: ICD-10-PCS | Mod: PBBFAC,,, | Performed by: INTERNAL MEDICINE

## 2021-03-30 PROCEDURE — 1126F AMNT PAIN NOTED NONE PRSNT: CPT | Mod: S$GLB,,, | Performed by: INTERNAL MEDICINE

## 2021-03-30 PROCEDURE — 99214 OFFICE O/P EST MOD 30 MIN: CPT | Mod: 25,S$GLB,, | Performed by: INTERNAL MEDICINE

## 2021-03-30 PROCEDURE — 3008F BODY MASS INDEX DOCD: CPT | Mod: CPTII,S$GLB,, | Performed by: INTERNAL MEDICINE

## 2021-03-30 PROCEDURE — 99999 PR PBB SHADOW E&M-EST. PATIENT-LVL III: CPT | Mod: PBBFAC,,, | Performed by: INTERNAL MEDICINE

## 2021-03-30 PROCEDURE — 1126F PR PAIN SEVERITY QUANTIFIED, NO PAIN PRESENT: ICD-10-PCS | Mod: S$GLB,,, | Performed by: INTERNAL MEDICINE

## 2021-03-30 PROCEDURE — 96372 THER/PROPH/DIAG INJ SC/IM: CPT | Mod: S$GLB,,, | Performed by: INTERNAL MEDICINE

## 2021-03-30 PROCEDURE — 96372 PR INJECTION,THERAP/PROPH/DIAG2ST, IM OR SUBCUT: ICD-10-PCS | Mod: S$GLB,,, | Performed by: INTERNAL MEDICINE

## 2021-03-30 PROCEDURE — 3008F PR BODY MASS INDEX (BMI) DOCUMENTED: ICD-10-PCS | Mod: CPTII,S$GLB,, | Performed by: INTERNAL MEDICINE

## 2021-03-30 RX ORDER — TRIAMCINOLONE ACETONIDE 1 MG/G
CREAM TOPICAL
Qty: 454 G | Refills: 3 | Status: SHIPPED | OUTPATIENT
Start: 2021-03-30

## 2021-03-30 RX ORDER — DOXYCYCLINE 100 MG/1
100 CAPSULE ORAL 2 TIMES DAILY
Qty: 14 CAPSULE | Refills: 0 | Status: SHIPPED | OUTPATIENT
Start: 2021-03-30 | End: 2021-04-06

## 2021-03-30 RX ORDER — TRIAMCINOLONE ACETONIDE 40 MG/ML
40 INJECTION, SUSPENSION INTRA-ARTICULAR; INTRAMUSCULAR
Status: COMPLETED | OUTPATIENT
Start: 2021-03-30 | End: 2021-03-30

## 2021-03-30 RX ORDER — METHYLPREDNISOLONE 4 MG/1
TABLET ORAL
Qty: 1 PACKAGE | Refills: 0 | Status: SHIPPED | OUTPATIENT
Start: 2021-03-30 | End: 2021-04-21

## 2021-03-30 RX ADMIN — TRIAMCINOLONE ACETONIDE 40 MG: 40 INJECTION, SUSPENSION INTRA-ARTICULAR; INTRAMUSCULAR at 11:03

## 2021-04-16 ENCOUNTER — PATIENT MESSAGE (OUTPATIENT)
Dept: RESEARCH | Facility: HOSPITAL | Age: 51
End: 2021-04-16

## 2021-04-21 ENCOUNTER — OFFICE VISIT (OUTPATIENT)
Dept: INTERNAL MEDICINE | Facility: CLINIC | Age: 51
End: 2021-04-21
Payer: COMMERCIAL

## 2021-04-21 VITALS
HEART RATE: 72 BPM | HEIGHT: 67 IN | RESPIRATION RATE: 18 BRPM | DIASTOLIC BLOOD PRESSURE: 86 MMHG | BODY MASS INDEX: 32.98 KG/M2 | TEMPERATURE: 98 F | SYSTOLIC BLOOD PRESSURE: 130 MMHG | OXYGEN SATURATION: 98 % | WEIGHT: 210.13 LBS

## 2021-04-21 DIAGNOSIS — F17.200 TOBACCO DEPENDENCE: ICD-10-CM

## 2021-04-21 DIAGNOSIS — D56.3 THALASSEMIA MINOR: ICD-10-CM

## 2021-04-21 DIAGNOSIS — L30.8 OTHER ECZEMA: ICD-10-CM

## 2021-04-21 DIAGNOSIS — Z12.11 COLON CANCER SCREENING: ICD-10-CM

## 2021-04-21 DIAGNOSIS — E66.9 OBESITY (BMI 30-39.9): ICD-10-CM

## 2021-04-21 DIAGNOSIS — Z00.00 ANNUAL PHYSICAL EXAM: Primary | ICD-10-CM

## 2021-04-21 PROCEDURE — 3008F BODY MASS INDEX DOCD: CPT | Mod: CPTII,S$GLB,, | Performed by: INTERNAL MEDICINE

## 2021-04-21 PROCEDURE — 1126F AMNT PAIN NOTED NONE PRSNT: CPT | Mod: S$GLB,,, | Performed by: INTERNAL MEDICINE

## 2021-04-21 PROCEDURE — 3008F PR BODY MASS INDEX (BMI) DOCUMENTED: ICD-10-PCS | Mod: CPTII,S$GLB,, | Performed by: INTERNAL MEDICINE

## 2021-04-21 PROCEDURE — 99396 PR PREVENTIVE VISIT,EST,40-64: ICD-10-PCS | Mod: S$GLB,,, | Performed by: INTERNAL MEDICINE

## 2021-04-21 PROCEDURE — 1126F PR PAIN SEVERITY QUANTIFIED, NO PAIN PRESENT: ICD-10-PCS | Mod: S$GLB,,, | Performed by: INTERNAL MEDICINE

## 2021-04-21 PROCEDURE — 99396 PREV VISIT EST AGE 40-64: CPT | Mod: S$GLB,,, | Performed by: INTERNAL MEDICINE

## 2021-04-21 PROCEDURE — 99999 PR PBB SHADOW E&M-EST. PATIENT-LVL III: ICD-10-PCS | Mod: PBBFAC,,, | Performed by: INTERNAL MEDICINE

## 2021-04-21 PROCEDURE — 99999 PR PBB SHADOW E&M-EST. PATIENT-LVL III: CPT | Mod: PBBFAC,,, | Performed by: INTERNAL MEDICINE

## 2021-04-26 ENCOUNTER — PATIENT OUTREACH (OUTPATIENT)
Dept: ADMINISTRATIVE | Facility: OTHER | Age: 51
End: 2021-04-26

## 2021-04-27 ENCOUNTER — LAB VISIT (OUTPATIENT)
Dept: LAB | Facility: HOSPITAL | Age: 51
End: 2021-04-27
Attending: INTERNAL MEDICINE
Payer: COMMERCIAL

## 2021-04-27 ENCOUNTER — OFFICE VISIT (OUTPATIENT)
Dept: DERMATOLOGY | Facility: CLINIC | Age: 51
End: 2021-04-27
Payer: COMMERCIAL

## 2021-04-27 VITALS — WEIGHT: 210 LBS | BODY MASS INDEX: 32.89 KG/M2

## 2021-04-27 DIAGNOSIS — R21 RASH: ICD-10-CM

## 2021-04-27 DIAGNOSIS — Z00.00 ANNUAL PHYSICAL EXAM: ICD-10-CM

## 2021-04-27 DIAGNOSIS — L28.0 LICHEN SIMPLEX: ICD-10-CM

## 2021-04-27 DIAGNOSIS — L30.9 DERMATITIS: Primary | ICD-10-CM

## 2021-04-27 LAB
BASOPHILS # BLD AUTO: 0.05 K/UL (ref 0–0.2)
BASOPHILS NFR BLD: 0.5 % (ref 0–1.9)
DIFFERENTIAL METHOD: ABNORMAL
EOSINOPHIL # BLD AUTO: 0.2 K/UL (ref 0–0.5)
EOSINOPHIL NFR BLD: 2.2 % (ref 0–8)
ERYTHROCYTE [DISTWIDTH] IN BLOOD BY AUTOMATED COUNT: 19.5 % (ref 11.5–14.5)
HCT VFR BLD AUTO: 45.4 % (ref 40–54)
HGB BLD-MCNC: 13.7 G/DL (ref 14–18)
IMM GRANULOCYTES # BLD AUTO: 0.05 K/UL (ref 0–0.04)
IMM GRANULOCYTES NFR BLD AUTO: 0.5 % (ref 0–0.5)
LYMPHOCYTES # BLD AUTO: 2.2 K/UL (ref 1–4.8)
LYMPHOCYTES NFR BLD: 23.8 % (ref 18–48)
MCH RBC QN AUTO: 20.5 PG (ref 27–31)
MCHC RBC AUTO-ENTMCNC: 30.2 G/DL (ref 32–36)
MCV RBC AUTO: 68 FL (ref 82–98)
MONOCYTES # BLD AUTO: 0.9 K/UL (ref 0.3–1)
MONOCYTES NFR BLD: 10.1 % (ref 4–15)
NEUTROPHILS # BLD AUTO: 5.8 K/UL (ref 1.8–7.7)
NEUTROPHILS NFR BLD: 62.9 % (ref 38–73)
NRBC BLD-RTO: 0 /100 WBC
PLATELET # BLD AUTO: 286 K/UL (ref 150–450)
PMV BLD AUTO: 10.4 FL (ref 9.2–12.9)
RBC # BLD AUTO: 6.69 M/UL (ref 4.6–6.2)
WBC # BLD AUTO: 9.21 K/UL (ref 3.9–12.7)

## 2021-04-27 PROCEDURE — 1126F PR PAIN SEVERITY QUANTIFIED, NO PAIN PRESENT: ICD-10-PCS | Mod: S$GLB,,, | Performed by: DERMATOLOGY

## 2021-04-27 PROCEDURE — 99213 PR OFFICE/OUTPT VISIT, EST, LEVL III, 20-29 MIN: ICD-10-PCS | Mod: S$GLB,,, | Performed by: DERMATOLOGY

## 2021-04-27 PROCEDURE — 85025 COMPLETE CBC W/AUTO DIFF WBC: CPT | Performed by: INTERNAL MEDICINE

## 2021-04-27 PROCEDURE — 36415 COLL VENOUS BLD VENIPUNCTURE: CPT | Mod: PO | Performed by: INTERNAL MEDICINE

## 2021-04-27 PROCEDURE — 80053 COMPREHEN METABOLIC PANEL: CPT | Performed by: INTERNAL MEDICINE

## 2021-04-27 PROCEDURE — 80061 LIPID PANEL: CPT | Performed by: INTERNAL MEDICINE

## 2021-04-27 PROCEDURE — 99999 PR PBB SHADOW E&M-EST. PATIENT-LVL III: ICD-10-PCS | Mod: PBBFAC,,, | Performed by: DERMATOLOGY

## 2021-04-27 PROCEDURE — 84443 ASSAY THYROID STIM HORMONE: CPT | Performed by: INTERNAL MEDICINE

## 2021-04-27 PROCEDURE — 3008F BODY MASS INDEX DOCD: CPT | Mod: CPTII,S$GLB,, | Performed by: DERMATOLOGY

## 2021-04-27 PROCEDURE — 99999 PR PBB SHADOW E&M-EST. PATIENT-LVL III: CPT | Mod: PBBFAC,,, | Performed by: DERMATOLOGY

## 2021-04-27 PROCEDURE — 84153 ASSAY OF PSA TOTAL: CPT | Performed by: INTERNAL MEDICINE

## 2021-04-27 PROCEDURE — 99213 OFFICE O/P EST LOW 20 MIN: CPT | Mod: S$GLB,,, | Performed by: DERMATOLOGY

## 2021-04-27 PROCEDURE — 1126F AMNT PAIN NOTED NONE PRSNT: CPT | Mod: S$GLB,,, | Performed by: DERMATOLOGY

## 2021-04-27 PROCEDURE — 3008F PR BODY MASS INDEX (BMI) DOCUMENTED: ICD-10-PCS | Mod: CPTII,S$GLB,, | Performed by: DERMATOLOGY

## 2021-04-27 RX ORDER — BETAMETHASONE DIPROPIONATE 0.5 MG/G
CREAM TOPICAL
Qty: 90 G | Refills: 3 | Status: SHIPPED | OUTPATIENT
Start: 2021-04-27

## 2021-04-28 ENCOUNTER — TELEPHONE (OUTPATIENT)
Dept: INTERNAL MEDICINE | Facility: CLINIC | Age: 51
End: 2021-04-28

## 2021-04-28 DIAGNOSIS — E78.5 HYPERLIPIDEMIA, UNSPECIFIED HYPERLIPIDEMIA TYPE: Primary | ICD-10-CM

## 2021-04-28 LAB
ALBUMIN SERPL BCP-MCNC: 3.7 G/DL (ref 3.5–5.2)
ALP SERPL-CCNC: 98 U/L (ref 55–135)
ALT SERPL W/O P-5'-P-CCNC: 17 U/L (ref 10–44)
ANION GAP SERPL CALC-SCNC: 10 MMOL/L (ref 8–16)
AST SERPL-CCNC: 20 U/L (ref 10–40)
BILIRUB SERPL-MCNC: 0.7 MG/DL (ref 0.1–1)
BUN SERPL-MCNC: 10 MG/DL (ref 6–20)
CALCIUM SERPL-MCNC: 9 MG/DL (ref 8.7–10.5)
CHLORIDE SERPL-SCNC: 104 MMOL/L (ref 95–110)
CHOLEST SERPL-MCNC: 232 MG/DL (ref 120–199)
CHOLEST/HDLC SERPL: 4.5 {RATIO} (ref 2–5)
CO2 SERPL-SCNC: 25 MMOL/L (ref 23–29)
COMPLEXED PSA SERPL-MCNC: 1.8 NG/ML (ref 0–4)
CREAT SERPL-MCNC: 0.9 MG/DL (ref 0.5–1.4)
EST. GFR  (AFRICAN AMERICAN): >60 ML/MIN/1.73 M^2
EST. GFR  (NON AFRICAN AMERICAN): >60 ML/MIN/1.73 M^2
GLUCOSE SERPL-MCNC: 63 MG/DL (ref 70–110)
HDLC SERPL-MCNC: 51 MG/DL (ref 40–75)
HDLC SERPL: 22 % (ref 20–50)
LDLC SERPL CALC-MCNC: 160.2 MG/DL (ref 63–159)
NONHDLC SERPL-MCNC: 181 MG/DL
POTASSIUM SERPL-SCNC: 4.8 MMOL/L (ref 3.5–5.1)
PROT SERPL-MCNC: 7.5 G/DL (ref 6–8.4)
SODIUM SERPL-SCNC: 139 MMOL/L (ref 136–145)
TRIGL SERPL-MCNC: 104 MG/DL (ref 30–150)
TSH SERPL DL<=0.005 MIU/L-ACNC: 1.94 UIU/ML (ref 0.4–4)

## 2021-06-21 ENCOUNTER — PATIENT OUTREACH (OUTPATIENT)
Dept: ADMINISTRATIVE | Facility: OTHER | Age: 51
End: 2021-06-21

## 2021-06-22 ENCOUNTER — OFFICE VISIT (OUTPATIENT)
Dept: DERMATOLOGY | Facility: CLINIC | Age: 51
End: 2021-06-22
Payer: COMMERCIAL

## 2021-06-22 VITALS — WEIGHT: 210 LBS | BODY MASS INDEX: 32.89 KG/M2

## 2021-06-22 DIAGNOSIS — L30.9 DERMATITIS: ICD-10-CM

## 2021-06-22 DIAGNOSIS — L28.0 LICHEN SIMPLEX: Primary | ICD-10-CM

## 2021-06-22 PROCEDURE — 3008F PR BODY MASS INDEX (BMI) DOCUMENTED: ICD-10-PCS | Mod: CPTII,S$GLB,, | Performed by: DERMATOLOGY

## 2021-06-22 PROCEDURE — 99999 PR PBB SHADOW E&M-EST. PATIENT-LVL III: CPT | Mod: PBBFAC,,, | Performed by: DERMATOLOGY

## 2021-06-22 PROCEDURE — 99213 OFFICE O/P EST LOW 20 MIN: CPT | Mod: S$GLB,,, | Performed by: DERMATOLOGY

## 2021-06-22 PROCEDURE — 3008F BODY MASS INDEX DOCD: CPT | Mod: CPTII,S$GLB,, | Performed by: DERMATOLOGY

## 2021-06-22 PROCEDURE — 1126F AMNT PAIN NOTED NONE PRSNT: CPT | Mod: S$GLB,,, | Performed by: DERMATOLOGY

## 2021-06-22 PROCEDURE — 99213 PR OFFICE/OUTPT VISIT, EST, LEVL III, 20-29 MIN: ICD-10-PCS | Mod: S$GLB,,, | Performed by: DERMATOLOGY

## 2021-06-22 PROCEDURE — 99999 PR PBB SHADOW E&M-EST. PATIENT-LVL III: ICD-10-PCS | Mod: PBBFAC,,, | Performed by: DERMATOLOGY

## 2021-06-22 PROCEDURE — 1126F PR PAIN SEVERITY QUANTIFIED, NO PAIN PRESENT: ICD-10-PCS | Mod: S$GLB,,, | Performed by: DERMATOLOGY

## 2021-06-22 RX ORDER — TRIAMCINOLONE ACETONIDE 1 MG/G
CREAM TOPICAL
Qty: 454 G | Refills: 3 | Status: SHIPPED | OUTPATIENT
Start: 2021-06-22

## 2021-06-22 RX ORDER — PREDNISONE 20 MG/1
20 TABLET ORAL DAILY
Qty: 15 TABLET | Refills: 0 | Status: SHIPPED | OUTPATIENT
Start: 2021-06-22 | End: 2021-07-07

## 2021-07-28 ENCOUNTER — LAB VISIT (OUTPATIENT)
Dept: LAB | Facility: HOSPITAL | Age: 51
End: 2021-07-28
Attending: INTERNAL MEDICINE
Payer: COMMERCIAL

## 2021-07-28 DIAGNOSIS — E78.5 HYPERLIPIDEMIA, UNSPECIFIED HYPERLIPIDEMIA TYPE: ICD-10-CM

## 2021-07-28 LAB
CHOLEST SERPL-MCNC: 190 MG/DL (ref 120–199)
CHOLEST/HDLC SERPL: 6.3 {RATIO} (ref 2–5)
HDLC SERPL-MCNC: 30 MG/DL (ref 40–75)
HDLC SERPL: 15.8 % (ref 20–50)
LDLC SERPL CALC-MCNC: 139.2 MG/DL (ref 63–159)
NONHDLC SERPL-MCNC: 160 MG/DL
TRIGL SERPL-MCNC: 104 MG/DL (ref 30–150)

## 2021-07-28 PROCEDURE — 80061 LIPID PANEL: CPT | Performed by: INTERNAL MEDICINE

## 2021-07-28 PROCEDURE — 36415 COLL VENOUS BLD VENIPUNCTURE: CPT | Mod: PO | Performed by: INTERNAL MEDICINE

## 2021-07-29 ENCOUNTER — TELEPHONE (OUTPATIENT)
Dept: INTERNAL MEDICINE | Facility: CLINIC | Age: 51
End: 2021-07-29

## 2021-08-02 ENCOUNTER — PATIENT OUTREACH (OUTPATIENT)
Dept: ADMINISTRATIVE | Facility: HOSPITAL | Age: 51
End: 2021-08-02

## 2021-09-15 ENCOUNTER — OFFICE VISIT (OUTPATIENT)
Dept: URGENT CARE | Facility: CLINIC | Age: 51
End: 2021-09-15
Payer: COMMERCIAL

## 2021-09-15 VITALS
OXYGEN SATURATION: 95 % | HEIGHT: 67 IN | BODY MASS INDEX: 32.96 KG/M2 | RESPIRATION RATE: 16 BRPM | TEMPERATURE: 100 F | SYSTOLIC BLOOD PRESSURE: 135 MMHG | DIASTOLIC BLOOD PRESSURE: 72 MMHG | WEIGHT: 210 LBS | HEART RATE: 113 BPM

## 2021-09-15 DIAGNOSIS — J98.01 ACUTE BRONCHOSPASM: ICD-10-CM

## 2021-09-15 DIAGNOSIS — J44.1 COPD EXACERBATION: ICD-10-CM

## 2021-09-15 DIAGNOSIS — J20.9 COPD (CHRONIC OBSTRUCTIVE PULMONARY DISEASE) WITH ACUTE BRONCHITIS: ICD-10-CM

## 2021-09-15 DIAGNOSIS — R06.02 SOB (SHORTNESS OF BREATH): Primary | ICD-10-CM

## 2021-09-15 DIAGNOSIS — J44.0 COPD (CHRONIC OBSTRUCTIVE PULMONARY DISEASE) WITH ACUTE BRONCHITIS: ICD-10-CM

## 2021-09-15 LAB
CTP QC/QA: YES
SARS-COV-2 RDRP RESP QL NAA+PROBE: NEGATIVE

## 2021-09-15 PROCEDURE — U0002 COVID-19 LAB TEST NON-CDC: HCPCS | Mod: QW,S$GLB,, | Performed by: INTERNAL MEDICINE

## 2021-09-15 PROCEDURE — 1160F PR REVIEW ALL MEDS BY PRESCRIBER/CLIN PHARMACIST DOCUMENTED: ICD-10-PCS | Mod: CPTII,S$GLB,, | Performed by: INTERNAL MEDICINE

## 2021-09-15 PROCEDURE — 71046 X-RAY EXAM CHEST 2 VIEWS: CPT | Mod: FY,S$GLB,, | Performed by: RADIOLOGY

## 2021-09-15 PROCEDURE — 3075F PR MOST RECENT SYSTOLIC BLOOD PRESS GE 130-139MM HG: ICD-10-PCS | Mod: CPTII,S$GLB,, | Performed by: INTERNAL MEDICINE

## 2021-09-15 PROCEDURE — 94640 PR INHAL RX, AIRWAY OBST/DX SPUTUM INDUCT: ICD-10-PCS | Mod: S$GLB,,, | Performed by: INTERNAL MEDICINE

## 2021-09-15 PROCEDURE — 71046 XR CHEST PA AND LATERAL: ICD-10-PCS | Mod: FY,S$GLB,, | Performed by: RADIOLOGY

## 2021-09-15 PROCEDURE — 3078F PR MOST RECENT DIASTOLIC BLOOD PRESSURE < 80 MM HG: ICD-10-PCS | Mod: CPTII,S$GLB,, | Performed by: INTERNAL MEDICINE

## 2021-09-15 PROCEDURE — 3008F PR BODY MASS INDEX (BMI) DOCUMENTED: ICD-10-PCS | Mod: CPTII,S$GLB,, | Performed by: INTERNAL MEDICINE

## 2021-09-15 PROCEDURE — 3078F DIAST BP <80 MM HG: CPT | Mod: CPTII,S$GLB,, | Performed by: INTERNAL MEDICINE

## 2021-09-15 PROCEDURE — 99214 OFFICE O/P EST MOD 30 MIN: CPT | Mod: 25,S$GLB,CS, | Performed by: INTERNAL MEDICINE

## 2021-09-15 PROCEDURE — 94640 AIRWAY INHALATION TREATMENT: CPT | Mod: S$GLB,,, | Performed by: INTERNAL MEDICINE

## 2021-09-15 PROCEDURE — 1159F MED LIST DOCD IN RCRD: CPT | Mod: CPTII,S$GLB,, | Performed by: INTERNAL MEDICINE

## 2021-09-15 PROCEDURE — 99214 PR OFFICE/OUTPT VISIT, EST, LEVL IV, 30-39 MIN: ICD-10-PCS | Mod: 25,S$GLB,CS, | Performed by: INTERNAL MEDICINE

## 2021-09-15 PROCEDURE — 3075F SYST BP GE 130 - 139MM HG: CPT | Mod: CPTII,S$GLB,, | Performed by: INTERNAL MEDICINE

## 2021-09-15 PROCEDURE — U0002: ICD-10-PCS | Mod: QW,S$GLB,, | Performed by: INTERNAL MEDICINE

## 2021-09-15 PROCEDURE — 96372 THER/PROPH/DIAG INJ SC/IM: CPT | Mod: 59,S$GLB,, | Performed by: INTERNAL MEDICINE

## 2021-09-15 PROCEDURE — 1160F RVW MEDS BY RX/DR IN RCRD: CPT | Mod: CPTII,S$GLB,, | Performed by: INTERNAL MEDICINE

## 2021-09-15 PROCEDURE — 3008F BODY MASS INDEX DOCD: CPT | Mod: CPTII,S$GLB,, | Performed by: INTERNAL MEDICINE

## 2021-09-15 PROCEDURE — 1159F PR MEDICATION LIST DOCUMENTED IN MEDICAL RECORD: ICD-10-PCS | Mod: CPTII,S$GLB,, | Performed by: INTERNAL MEDICINE

## 2021-09-15 PROCEDURE — 96372 PR INJECTION,THERAP/PROPH/DIAG2ST, IM OR SUBCUT: ICD-10-PCS | Mod: 59,S$GLB,, | Performed by: INTERNAL MEDICINE

## 2021-09-15 RX ORDER — IPRATROPIUM BROMIDE 0.5 MG/2.5ML
0.5 SOLUTION RESPIRATORY (INHALATION)
Status: COMPLETED | OUTPATIENT
Start: 2021-09-15 | End: 2021-09-15

## 2021-09-15 RX ORDER — ALBUTEROL SULFATE 90 UG/1
2 AEROSOL, METERED RESPIRATORY (INHALATION) EVERY 4 HOURS PRN
Qty: 18 G | Refills: 0 | Status: SHIPPED | OUTPATIENT
Start: 2021-09-15 | End: 2021-09-28 | Stop reason: SDUPTHER

## 2021-09-15 RX ORDER — LEVOFLOXACIN 500 MG/1
500 TABLET, FILM COATED ORAL DAILY
Qty: 10 TABLET | Refills: 0 | Status: SHIPPED | OUTPATIENT
Start: 2021-09-15 | End: 2021-09-25

## 2021-09-15 RX ORDER — METHYLPREDNISOLONE 4 MG/1
TABLET ORAL
Qty: 1 PACKAGE | Refills: 0 | Status: SHIPPED | OUTPATIENT
Start: 2021-09-16 | End: 2022-09-18 | Stop reason: ALTCHOICE

## 2021-09-15 RX ORDER — DEXAMETHASONE SODIUM PHOSPHATE 100 MG/10ML
10 INJECTION INTRAMUSCULAR; INTRAVENOUS
Status: COMPLETED | OUTPATIENT
Start: 2021-09-15 | End: 2021-09-15

## 2021-09-15 RX ORDER — ALBUTEROL SULFATE 0.83 MG/ML
2.5 SOLUTION RESPIRATORY (INHALATION)
Status: COMPLETED | OUTPATIENT
Start: 2021-09-15 | End: 2021-09-15

## 2021-09-15 RX ADMIN — ALBUTEROL SULFATE 2.5 MG: 0.83 SOLUTION RESPIRATORY (INHALATION) at 12:09

## 2021-09-15 RX ADMIN — DEXAMETHASONE SODIUM PHOSPHATE 10 MG: 100 INJECTION INTRAMUSCULAR; INTRAVENOUS at 12:09

## 2021-09-15 RX ADMIN — IPRATROPIUM BROMIDE 0.5 MG: 0.5 SOLUTION RESPIRATORY (INHALATION) at 12:09

## 2021-09-28 ENCOUNTER — OFFICE VISIT (OUTPATIENT)
Dept: INTERNAL MEDICINE | Facility: CLINIC | Age: 51
End: 2021-09-28
Payer: COMMERCIAL

## 2021-09-28 VITALS
WEIGHT: 199.5 LBS | TEMPERATURE: 98 F | SYSTOLIC BLOOD PRESSURE: 132 MMHG | BODY MASS INDEX: 31.31 KG/M2 | HEIGHT: 67 IN | HEART RATE: 102 BPM | OXYGEN SATURATION: 96 % | DIASTOLIC BLOOD PRESSURE: 82 MMHG | RESPIRATION RATE: 16 BRPM

## 2021-09-28 DIAGNOSIS — J44.9 CHRONIC OBSTRUCTIVE PULMONARY DISEASE, UNSPECIFIED COPD TYPE: Primary | ICD-10-CM

## 2021-09-28 DIAGNOSIS — L30.8 OTHER ECZEMA: ICD-10-CM

## 2021-09-28 DIAGNOSIS — J44.0 COPD (CHRONIC OBSTRUCTIVE PULMONARY DISEASE) WITH ACUTE BRONCHITIS: ICD-10-CM

## 2021-09-28 DIAGNOSIS — F17.210 NICOTINE DEPENDENCE, CIGARETTES, UNCOMPLICATED: ICD-10-CM

## 2021-09-28 DIAGNOSIS — F17.200 TOBACCO DEPENDENCE: ICD-10-CM

## 2021-09-28 DIAGNOSIS — J44.1 COPD EXACERBATION: ICD-10-CM

## 2021-09-28 DIAGNOSIS — J20.9 COPD (CHRONIC OBSTRUCTIVE PULMONARY DISEASE) WITH ACUTE BRONCHITIS: ICD-10-CM

## 2021-09-28 DIAGNOSIS — J98.01 ACUTE BRONCHOSPASM: ICD-10-CM

## 2021-09-28 PROCEDURE — 3075F SYST BP GE 130 - 139MM HG: CPT | Mod: CPTII,S$GLB,, | Performed by: STUDENT IN AN ORGANIZED HEALTH CARE EDUCATION/TRAINING PROGRAM

## 2021-09-28 PROCEDURE — 3079F DIAST BP 80-89 MM HG: CPT | Mod: CPTII,S$GLB,, | Performed by: STUDENT IN AN ORGANIZED HEALTH CARE EDUCATION/TRAINING PROGRAM

## 2021-09-28 PROCEDURE — 99999 PR PBB SHADOW E&M-EST. PATIENT-LVL V: CPT | Mod: PBBFAC,,, | Performed by: STUDENT IN AN ORGANIZED HEALTH CARE EDUCATION/TRAINING PROGRAM

## 2021-09-28 PROCEDURE — 3075F PR MOST RECENT SYSTOLIC BLOOD PRESS GE 130-139MM HG: ICD-10-PCS | Mod: CPTII,S$GLB,, | Performed by: STUDENT IN AN ORGANIZED HEALTH CARE EDUCATION/TRAINING PROGRAM

## 2021-09-28 PROCEDURE — 3079F PR MOST RECENT DIASTOLIC BLOOD PRESSURE 80-89 MM HG: ICD-10-PCS | Mod: CPTII,S$GLB,, | Performed by: STUDENT IN AN ORGANIZED HEALTH CARE EDUCATION/TRAINING PROGRAM

## 2021-09-28 PROCEDURE — 99214 OFFICE O/P EST MOD 30 MIN: CPT | Mod: S$GLB,,, | Performed by: STUDENT IN AN ORGANIZED HEALTH CARE EDUCATION/TRAINING PROGRAM

## 2021-09-28 PROCEDURE — 1159F PR MEDICATION LIST DOCUMENTED IN MEDICAL RECORD: ICD-10-PCS | Mod: CPTII,S$GLB,, | Performed by: STUDENT IN AN ORGANIZED HEALTH CARE EDUCATION/TRAINING PROGRAM

## 2021-09-28 PROCEDURE — 1159F MED LIST DOCD IN RCRD: CPT | Mod: CPTII,S$GLB,, | Performed by: STUDENT IN AN ORGANIZED HEALTH CARE EDUCATION/TRAINING PROGRAM

## 2021-09-28 PROCEDURE — 99999 PR PBB SHADOW E&M-EST. PATIENT-LVL V: ICD-10-PCS | Mod: PBBFAC,,, | Performed by: STUDENT IN AN ORGANIZED HEALTH CARE EDUCATION/TRAINING PROGRAM

## 2021-09-28 PROCEDURE — 99214 PR OFFICE/OUTPT VISIT, EST, LEVL IV, 30-39 MIN: ICD-10-PCS | Mod: S$GLB,,, | Performed by: STUDENT IN AN ORGANIZED HEALTH CARE EDUCATION/TRAINING PROGRAM

## 2021-09-28 PROCEDURE — 3008F BODY MASS INDEX DOCD: CPT | Mod: CPTII,S$GLB,, | Performed by: STUDENT IN AN ORGANIZED HEALTH CARE EDUCATION/TRAINING PROGRAM

## 2021-09-28 PROCEDURE — 3008F PR BODY MASS INDEX (BMI) DOCUMENTED: ICD-10-PCS | Mod: CPTII,S$GLB,, | Performed by: STUDENT IN AN ORGANIZED HEALTH CARE EDUCATION/TRAINING PROGRAM

## 2021-09-28 RX ORDER — ALBUTEROL SULFATE 90 UG/1
2 AEROSOL, METERED RESPIRATORY (INHALATION) EVERY 4 HOURS PRN
Qty: 18 G | Refills: 1 | Status: SHIPPED | OUTPATIENT
Start: 2021-09-28 | End: 2022-09-18 | Stop reason: SDUPTHER

## 2021-10-01 ENCOUNTER — TELEPHONE (OUTPATIENT)
Dept: INTERNAL MEDICINE | Facility: CLINIC | Age: 51
End: 2021-10-01

## 2021-10-01 ENCOUNTER — HOSPITAL ENCOUNTER (OUTPATIENT)
Dept: RADIOLOGY | Facility: HOSPITAL | Age: 51
Discharge: HOME | End: 2021-10-01
Attending: STUDENT IN AN ORGANIZED HEALTH CARE EDUCATION/TRAINING PROGRAM
Payer: COMMERCIAL

## 2021-10-01 ENCOUNTER — OFFICE VISIT (OUTPATIENT)
Dept: DERMATOLOGY | Facility: CLINIC | Age: 51
End: 2021-10-01
Payer: COMMERCIAL

## 2021-10-01 DIAGNOSIS — L01.00 IMPETIGO: ICD-10-CM

## 2021-10-01 DIAGNOSIS — F17.210 NICOTINE DEPENDENCE, CIGARETTES, UNCOMPLICATED: ICD-10-CM

## 2021-10-01 DIAGNOSIS — L30.9 DERMATITIS: Primary | ICD-10-CM

## 2021-10-01 DIAGNOSIS — L29.9 PRURITUS: ICD-10-CM

## 2021-10-01 PROCEDURE — 71271 CT THORAX LUNG CANCER SCR C-: CPT | Mod: TC,MG

## 2021-10-01 PROCEDURE — G1004 CDSM NDSC: HCPCS | Mod: ,,, | Performed by: INTERNAL MEDICINE

## 2021-10-01 PROCEDURE — 99214 OFFICE O/P EST MOD 30 MIN: CPT | Mod: 25,S$GLB,, | Performed by: STUDENT IN AN ORGANIZED HEALTH CARE EDUCATION/TRAINING PROGRAM

## 2021-10-01 PROCEDURE — 88312 SPECIAL STAINS GROUP 1: CPT | Performed by: PATHOLOGY

## 2021-10-01 PROCEDURE — 1159F MED LIST DOCD IN RCRD: CPT | Mod: CPTII,S$GLB,, | Performed by: STUDENT IN AN ORGANIZED HEALTH CARE EDUCATION/TRAINING PROGRAM

## 2021-10-01 PROCEDURE — 1160F RVW MEDS BY RX/DR IN RCRD: CPT | Mod: CPTII,S$GLB,, | Performed by: STUDENT IN AN ORGANIZED HEALTH CARE EDUCATION/TRAINING PROGRAM

## 2021-10-01 PROCEDURE — 88305 TISSUE EXAM BY PATHOLOGIST: CPT | Mod: 26,,, | Performed by: PATHOLOGY

## 2021-10-01 PROCEDURE — 99999 PR PBB SHADOW E&M-EST. PATIENT-LVL III: ICD-10-PCS | Mod: PBBFAC,,, | Performed by: STUDENT IN AN ORGANIZED HEALTH CARE EDUCATION/TRAINING PROGRAM

## 2021-10-01 PROCEDURE — 11102 TANGNTL BX SKIN SINGLE LES: CPT | Mod: S$GLB,,, | Performed by: STUDENT IN AN ORGANIZED HEALTH CARE EDUCATION/TRAINING PROGRAM

## 2021-10-01 PROCEDURE — 11102 PR TANGENTIAL BIOPSY, SKIN, SINGLE LESION: ICD-10-PCS | Mod: S$GLB,,, | Performed by: STUDENT IN AN ORGANIZED HEALTH CARE EDUCATION/TRAINING PROGRAM

## 2021-10-01 PROCEDURE — 88312 SPECIAL STAINS GROUP 1: CPT | Mod: 26,,, | Performed by: PATHOLOGY

## 2021-10-01 PROCEDURE — G1004 CDSM NDSC: HCPCS

## 2021-10-01 PROCEDURE — 1159F PR MEDICATION LIST DOCUMENTED IN MEDICAL RECORD: ICD-10-PCS | Mod: CPTII,S$GLB,, | Performed by: STUDENT IN AN ORGANIZED HEALTH CARE EDUCATION/TRAINING PROGRAM

## 2021-10-01 PROCEDURE — 1160F PR REVIEW ALL MEDS BY PRESCRIBER/CLIN PHARMACIST DOCUMENTED: ICD-10-PCS | Mod: CPTII,S$GLB,, | Performed by: STUDENT IN AN ORGANIZED HEALTH CARE EDUCATION/TRAINING PROGRAM

## 2021-10-01 PROCEDURE — 88312 PR  SPECIAL STAINS,GROUP I: ICD-10-PCS | Mod: 26,,, | Performed by: PATHOLOGY

## 2021-10-01 PROCEDURE — 99999 PR PBB SHADOW E&M-EST. PATIENT-LVL III: CPT | Mod: PBBFAC,,, | Performed by: STUDENT IN AN ORGANIZED HEALTH CARE EDUCATION/TRAINING PROGRAM

## 2021-10-01 PROCEDURE — 71271 CT THORAX LUNG CANCER SCR C-: CPT | Mod: 26,MG,, | Performed by: INTERNAL MEDICINE

## 2021-10-01 PROCEDURE — G1004 CT CHEST LUNG SCREENING LOW DOSE: ICD-10-PCS | Mod: ,,, | Performed by: INTERNAL MEDICINE

## 2021-10-01 PROCEDURE — 88305 TISSUE EXAM BY PATHOLOGIST: CPT | Performed by: PATHOLOGY

## 2021-10-01 PROCEDURE — 71271 CT CHEST LUNG SCREENING LOW DOSE: ICD-10-PCS | Mod: 26,MG,, | Performed by: INTERNAL MEDICINE

## 2021-10-01 PROCEDURE — 99214 PR OFFICE/OUTPT VISIT, EST, LEVL IV, 30-39 MIN: ICD-10-PCS | Mod: 25,S$GLB,, | Performed by: STUDENT IN AN ORGANIZED HEALTH CARE EDUCATION/TRAINING PROGRAM

## 2021-10-01 PROCEDURE — 88305 TISSUE EXAM BY PATHOLOGIST: ICD-10-PCS | Mod: 26,,, | Performed by: PATHOLOGY

## 2021-10-01 RX ORDER — DOXYCYCLINE HYCLATE 100 MG
TABLET ORAL
Qty: 60 TABLET | Refills: 0 | Status: SHIPPED | OUTPATIENT
Start: 2021-10-01

## 2021-10-01 RX ORDER — CLOBETASOL PROPIONATE 0.5 MG/G
OINTMENT TOPICAL 2 TIMES DAILY
Qty: 60 G | Refills: 5 | Status: SHIPPED | OUTPATIENT
Start: 2021-10-01

## 2021-10-01 RX ORDER — HYDROXYZINE HYDROCHLORIDE 25 MG/1
25 TABLET, FILM COATED ORAL NIGHTLY
Qty: 30 TABLET | Refills: 3 | Status: SHIPPED | OUTPATIENT
Start: 2021-10-01

## 2021-10-04 ENCOUNTER — LAB VISIT (OUTPATIENT)
Dept: SPORTS MEDICINE | Facility: CLINIC | Age: 51
End: 2021-10-04
Payer: COMMERCIAL

## 2021-10-04 DIAGNOSIS — Z13.9 SCREENING PROCEDURE: ICD-10-CM

## 2021-10-04 PROCEDURE — U0005 INFEC AGEN DETEC AMPLI PROBE: HCPCS | Performed by: STUDENT IN AN ORGANIZED HEALTH CARE EDUCATION/TRAINING PROGRAM

## 2021-10-04 PROCEDURE — U0003 INFECTIOUS AGENT DETECTION BY NUCLEIC ACID (DNA OR RNA); SEVERE ACUTE RESPIRATORY SYNDROME CORONAVIRUS 2 (SARS-COV-2) (CORONAVIRUS DISEASE [COVID-19]), AMPLIFIED PROBE TECHNIQUE, MAKING USE OF HIGH THROUGHPUT TECHNOLOGIES AS DESCRIBED BY CMS-2020-01-R: HCPCS | Performed by: STUDENT IN AN ORGANIZED HEALTH CARE EDUCATION/TRAINING PROGRAM

## 2021-10-05 LAB
SARS-COV-2 RNA RESP QL NAA+PROBE: NOT DETECTED
SARS-COV-2- CYCLE NUMBER: NORMAL

## 2021-10-07 ENCOUNTER — HOSPITAL ENCOUNTER (OUTPATIENT)
Dept: PULMONOLOGY | Facility: CLINIC | Age: 51
Discharge: HOME | End: 2021-10-07
Payer: COMMERCIAL

## 2021-10-07 DIAGNOSIS — J44.0 COPD (CHRONIC OBSTRUCTIVE PULMONARY DISEASE) WITH ACUTE BRONCHITIS: ICD-10-CM

## 2021-10-07 DIAGNOSIS — J20.9 COPD (CHRONIC OBSTRUCTIVE PULMONARY DISEASE) WITH ACUTE BRONCHITIS: ICD-10-CM

## 2021-10-07 DIAGNOSIS — F17.200 TOBACCO DEPENDENCE: ICD-10-CM

## 2021-10-07 LAB
FINAL PATHOLOGIC DIAGNOSIS: NORMAL
GROSS: NORMAL
Lab: NORMAL
MICROSCOPIC EXAM: NORMAL

## 2021-10-07 PROCEDURE — 94729 PR C02/MEMBANE DIFFUSE CAPACITY: ICD-10-PCS | Mod: S$GLB,,, | Performed by: INTERNAL MEDICINE

## 2021-10-07 PROCEDURE — 94729 DIFFUSING CAPACITY: CPT | Mod: S$GLB,,, | Performed by: INTERNAL MEDICINE

## 2021-10-07 PROCEDURE — 94060 EVALUATION OF WHEEZING: CPT | Mod: S$GLB,,, | Performed by: INTERNAL MEDICINE

## 2021-10-07 PROCEDURE — 94727 PR PULM FUNCTION TEST BY GAS: ICD-10-PCS | Mod: S$GLB,,, | Performed by: INTERNAL MEDICINE

## 2021-10-07 PROCEDURE — 94060 PR EVAL OF BRONCHOSPASM: ICD-10-PCS | Mod: S$GLB,,, | Performed by: INTERNAL MEDICINE

## 2021-10-07 PROCEDURE — 94727 GAS DIL/WSHOT DETER LNG VOL: CPT | Mod: S$GLB,,, | Performed by: INTERNAL MEDICINE

## 2021-10-13 ENCOUNTER — TELEPHONE (OUTPATIENT)
Dept: DERMATOLOGY | Facility: CLINIC | Age: 51
End: 2021-10-13

## 2021-10-14 ENCOUNTER — OFFICE VISIT (OUTPATIENT)
Dept: PULMONOLOGY | Facility: CLINIC | Age: 51
End: 2021-10-14
Payer: COMMERCIAL

## 2021-10-14 VITALS
HEART RATE: 99 BPM | OXYGEN SATURATION: 98 % | DIASTOLIC BLOOD PRESSURE: 82 MMHG | BODY MASS INDEX: 30.97 KG/M2 | HEIGHT: 67 IN | WEIGHT: 197.31 LBS | SYSTOLIC BLOOD PRESSURE: 124 MMHG

## 2021-10-14 DIAGNOSIS — J44.9 CHRONIC OBSTRUCTIVE PULMONARY DISEASE, UNSPECIFIED COPD TYPE: ICD-10-CM

## 2021-10-14 DIAGNOSIS — F17.200 TOBACCO DEPENDENCE: ICD-10-CM

## 2021-10-14 PROCEDURE — 3074F SYST BP LT 130 MM HG: CPT | Mod: CPTII,S$GLB,, | Performed by: NURSE PRACTITIONER

## 2021-10-14 PROCEDURE — 3079F DIAST BP 80-89 MM HG: CPT | Mod: CPTII,S$GLB,, | Performed by: NURSE PRACTITIONER

## 2021-10-14 PROCEDURE — 1160F RVW MEDS BY RX/DR IN RCRD: CPT | Mod: CPTII,S$GLB,, | Performed by: NURSE PRACTITIONER

## 2021-10-14 PROCEDURE — 3079F PR MOST RECENT DIASTOLIC BLOOD PRESSURE 80-89 MM HG: ICD-10-PCS | Mod: CPTII,S$GLB,, | Performed by: NURSE PRACTITIONER

## 2021-10-14 PROCEDURE — 1159F PR MEDICATION LIST DOCUMENTED IN MEDICAL RECORD: ICD-10-PCS | Mod: CPTII,S$GLB,, | Performed by: NURSE PRACTITIONER

## 2021-10-14 PROCEDURE — 3008F PR BODY MASS INDEX (BMI) DOCUMENTED: ICD-10-PCS | Mod: CPTII,S$GLB,, | Performed by: NURSE PRACTITIONER

## 2021-10-14 PROCEDURE — 3074F PR MOST RECENT SYSTOLIC BLOOD PRESSURE < 130 MM HG: ICD-10-PCS | Mod: CPTII,S$GLB,, | Performed by: NURSE PRACTITIONER

## 2021-10-14 PROCEDURE — 99999 PR PBB SHADOW E&M-EST. PATIENT-LVL IV: CPT | Mod: PBBFAC,,, | Performed by: NURSE PRACTITIONER

## 2021-10-14 PROCEDURE — 1159F MED LIST DOCD IN RCRD: CPT | Mod: CPTII,S$GLB,, | Performed by: NURSE PRACTITIONER

## 2021-10-14 PROCEDURE — 99406 BEHAV CHNG SMOKING 3-10 MIN: CPT | Mod: S$GLB,,, | Performed by: NURSE PRACTITIONER

## 2021-10-14 PROCEDURE — 3008F BODY MASS INDEX DOCD: CPT | Mod: CPTII,S$GLB,, | Performed by: NURSE PRACTITIONER

## 2021-10-14 PROCEDURE — 99203 PR OFFICE/OUTPT VISIT, NEW, LEVL III, 30-44 MIN: ICD-10-PCS | Mod: 25,S$GLB,, | Performed by: NURSE PRACTITIONER

## 2021-10-14 PROCEDURE — 1160F PR REVIEW ALL MEDS BY PRESCRIBER/CLIN PHARMACIST DOCUMENTED: ICD-10-PCS | Mod: CPTII,S$GLB,, | Performed by: NURSE PRACTITIONER

## 2021-10-14 PROCEDURE — 99203 OFFICE O/P NEW LOW 30 MIN: CPT | Mod: 25,S$GLB,, | Performed by: NURSE PRACTITIONER

## 2021-10-14 PROCEDURE — 99999 PR PBB SHADOW E&M-EST. PATIENT-LVL IV: ICD-10-PCS | Mod: PBBFAC,,, | Performed by: NURSE PRACTITIONER

## 2021-10-14 PROCEDURE — 99406 PR TOBACCO USE CESSATION INTERMEDIATE 3-10 MINUTES: ICD-10-PCS | Mod: S$GLB,,, | Performed by: NURSE PRACTITIONER

## 2021-10-14 RX ORDER — UMECLIDINIUM 62.5 UG/1
62.5 AEROSOL, POWDER ORAL DAILY
Qty: 30 EACH | Refills: 5 | Status: SHIPPED | OUTPATIENT
Start: 2021-10-14

## 2021-10-19 ENCOUNTER — PATIENT OUTREACH (OUTPATIENT)
Dept: ADMINISTRATIVE | Facility: OTHER | Age: 51
End: 2021-10-19

## 2021-10-21 ENCOUNTER — OFFICE VISIT (OUTPATIENT)
Dept: DERMATOLOGY | Facility: CLINIC | Age: 51
End: 2021-10-21
Payer: COMMERCIAL

## 2021-10-21 DIAGNOSIS — L28.0 LICHEN SIMPLEX: ICD-10-CM

## 2021-10-21 DIAGNOSIS — L29.9 PRURITUS: ICD-10-CM

## 2021-10-21 DIAGNOSIS — L20.9 ATOPIC DERMATITIS, UNSPECIFIED TYPE: Primary | ICD-10-CM

## 2021-10-21 PROCEDURE — 99999 PR PBB SHADOW E&M-EST. PATIENT-LVL III: CPT | Mod: PBBFAC,,, | Performed by: STUDENT IN AN ORGANIZED HEALTH CARE EDUCATION/TRAINING PROGRAM

## 2021-10-21 PROCEDURE — 99999 PR PBB SHADOW E&M-EST. PATIENT-LVL III: ICD-10-PCS | Mod: PBBFAC,,, | Performed by: STUDENT IN AN ORGANIZED HEALTH CARE EDUCATION/TRAINING PROGRAM

## 2021-10-21 PROCEDURE — 99214 PR OFFICE/OUTPT VISIT, EST, LEVL IV, 30-39 MIN: ICD-10-PCS | Mod: S$GLB,,, | Performed by: STUDENT IN AN ORGANIZED HEALTH CARE EDUCATION/TRAINING PROGRAM

## 2021-10-21 PROCEDURE — 99214 OFFICE O/P EST MOD 30 MIN: CPT | Mod: S$GLB,,, | Performed by: STUDENT IN AN ORGANIZED HEALTH CARE EDUCATION/TRAINING PROGRAM

## 2021-10-21 PROCEDURE — 1160F RVW MEDS BY RX/DR IN RCRD: CPT | Mod: CPTII,S$GLB,, | Performed by: STUDENT IN AN ORGANIZED HEALTH CARE EDUCATION/TRAINING PROGRAM

## 2021-10-21 PROCEDURE — 1159F MED LIST DOCD IN RCRD: CPT | Mod: CPTII,S$GLB,, | Performed by: STUDENT IN AN ORGANIZED HEALTH CARE EDUCATION/TRAINING PROGRAM

## 2021-10-21 PROCEDURE — 1159F PR MEDICATION LIST DOCUMENTED IN MEDICAL RECORD: ICD-10-PCS | Mod: CPTII,S$GLB,, | Performed by: STUDENT IN AN ORGANIZED HEALTH CARE EDUCATION/TRAINING PROGRAM

## 2021-10-21 PROCEDURE — 1160F PR REVIEW ALL MEDS BY PRESCRIBER/CLIN PHARMACIST DOCUMENTED: ICD-10-PCS | Mod: CPTII,S$GLB,, | Performed by: STUDENT IN AN ORGANIZED HEALTH CARE EDUCATION/TRAINING PROGRAM

## 2021-10-21 RX ORDER — DUPILUMAB 300 MG/2ML
INJECTION, SOLUTION SUBCUTANEOUS
Qty: 4 ML | Refills: 2 | Status: SHIPPED | OUTPATIENT
Start: 2021-10-21

## 2021-10-21 RX ORDER — DUPILUMAB 300 MG/2ML
INJECTION, SOLUTION SUBCUTANEOUS
Qty: 8 ML | Refills: 0 | Status: SHIPPED | OUTPATIENT
Start: 2021-10-21

## 2021-10-22 ENCOUNTER — SPECIALTY PHARMACY (OUTPATIENT)
Dept: PHARMACY | Facility: CLINIC | Age: 51
End: 2021-10-22
Payer: COMMERCIAL

## 2021-11-09 NOTE — TELEPHONE ENCOUNTER
"CaseId:[id number];Status:Denied;Review Type:Prior Auth;Appeal Information: Attention:ATTN: CLINICAL APPEALS [facility] [address] [address],[place], [place],[address] Phone:[phone] Fax:[phone];    Denied due to "patient has not been adequately controlled on topical prescription therapies. No evidence of try/fail steroid AND tacrolimus"    [facility] preparing appeal. Pt has High BSA %. It is unreasonable to expect pt to continue topical prescriptions.       Appeal submitted via fax.    "

## 2021-11-24 ENCOUNTER — PATIENT MESSAGE (OUTPATIENT)
Dept: DERMATOLOGY | Facility: CLINIC | Age: 51
End: 2021-11-24
Payer: COMMERCIAL

## 2021-11-24 DIAGNOSIS — L29.9 PRURITUS: ICD-10-CM

## 2021-11-24 DIAGNOSIS — L20.9 ATOPIC DERMATITIS, UNSPECIFIED TYPE: Primary | ICD-10-CM

## 2021-11-24 RX ORDER — TACROLIMUS 1 MG/G
OINTMENT TOPICAL 2 TIMES DAILY
Qty: 60 G | Refills: 3 | Status: SHIPPED | OUTPATIENT
Start: 2021-11-24

## 2021-11-24 NOTE — TELEPHONE ENCOUNTER
[employer] appeal denied (scanned denial letter into Media).  Plan prefers that the patient try topical presctription therapies, such as topical corticosteroid, AND generic tacrolimus.    Need for patient signature and date on External Appeal Filing form to proceed with external appeal (document included within appeal denial letter scanned into Media).     Attempted to reach patient to inform and unable to LVM. Messaged Dr. Quintana and staff to inform.

## 2021-12-22 ENCOUNTER — PATIENT OUTREACH (OUTPATIENT)
Dept: ADMINISTRATIVE | Facility: OTHER | Age: 51
End: 2021-12-22
Payer: COMMERCIAL

## 2021-12-28 ENCOUNTER — PATIENT MESSAGE (OUTPATIENT)
Dept: PHARMACY | Facility: CLINIC | Age: 51
End: 2021-12-28
Payer: COMMERCIAL

## 2021-12-30 NOTE — TELEPHONE ENCOUNTER
Reached patient to inform [employer] appeal was denied and patient's signed consent is needed to proceed with external appeal. Patient voiced understanding. He asked to send document via email to [email or web address]. Patient had no further questions or concerns.     [facility] sent consent form to patient's email on 12/30 - will proceed with second level appeal once consent form is completed and returned.

## 2022-03-09 NOTE — TELEPHONE ENCOUNTER
I have attempted to contact patient regarding additional information to continue the financial assistance process. I have been unable to reach patient after 3 attemps. I am rerouting to assigned pharmacist to close referral

## 2022-04-21 ENCOUNTER — PATIENT MESSAGE (OUTPATIENT)
Dept: ADMINISTRATIVE | Facility: HOSPITAL | Age: 52
End: 2022-04-21
Payer: COMMERCIAL

## 2022-06-20 ENCOUNTER — TELEPHONE (OUTPATIENT)
Dept: PULMONOLOGY | Facility: CLINIC | Age: 52
End: 2022-06-20
Payer: COMMERCIAL

## 2022-06-20 NOTE — TELEPHONE ENCOUNTER
----- Message from Jg Nicole sent at 6/20/2022  3:27 PM CDT -----  Contact: Migel ( zoltan ) [phone]  Caller requesting a return phone call about scheduling NP appt, ( former pt of Shweta Butler) pls call

## 2022-09-18 ENCOUNTER — OFFICE VISIT (OUTPATIENT)
Dept: URGENT CARE | Facility: CLINIC | Age: 52
End: 2022-09-18
Payer: COMMERCIAL

## 2022-09-18 VITALS
BODY MASS INDEX: 29.82 KG/M2 | WEIGHT: 190 LBS | SYSTOLIC BLOOD PRESSURE: 129 MMHG | OXYGEN SATURATION: 97 % | HEART RATE: 94 BPM | DIASTOLIC BLOOD PRESSURE: 79 MMHG | RESPIRATION RATE: 20 BRPM | TEMPERATURE: 99 F | HEIGHT: 67 IN

## 2022-09-18 DIAGNOSIS — J20.9 COPD (CHRONIC OBSTRUCTIVE PULMONARY DISEASE) WITH ACUTE BRONCHITIS: ICD-10-CM

## 2022-09-18 DIAGNOSIS — F17.200 NEEDS SMOKING CESSATION EDUCATION: ICD-10-CM

## 2022-09-18 DIAGNOSIS — J44.0 COPD (CHRONIC OBSTRUCTIVE PULMONARY DISEASE) WITH ACUTE BRONCHITIS: ICD-10-CM

## 2022-09-18 DIAGNOSIS — J44.1 COPD EXACERBATION: ICD-10-CM

## 2022-09-18 PROCEDURE — 99213 OFFICE O/P EST LOW 20 MIN: CPT | Mod: S$GLB,,,

## 2022-09-18 PROCEDURE — 1160F PR REVIEW ALL MEDS BY PRESCRIBER/CLIN PHARMACIST DOCUMENTED: ICD-10-PCS | Mod: CPTII,S$GLB,,

## 2022-09-18 PROCEDURE — 3078F PR MOST RECENT DIASTOLIC BLOOD PRESSURE < 80 MM HG: ICD-10-PCS | Mod: CPTII,S$GLB,,

## 2022-09-18 PROCEDURE — 99213 PR OFFICE/OUTPT VISIT, EST, LEVL III, 20-29 MIN: ICD-10-PCS | Mod: S$GLB,,,

## 2022-09-18 PROCEDURE — 3074F SYST BP LT 130 MM HG: CPT | Mod: CPTII,S$GLB,,

## 2022-09-18 PROCEDURE — 3008F PR BODY MASS INDEX (BMI) DOCUMENTED: ICD-10-PCS | Mod: CPTII,S$GLB,,

## 2022-09-18 PROCEDURE — 1159F MED LIST DOCD IN RCRD: CPT | Mod: CPTII,S$GLB,,

## 2022-09-18 PROCEDURE — 1160F RVW MEDS BY RX/DR IN RCRD: CPT | Mod: CPTII,S$GLB,,

## 2022-09-18 PROCEDURE — 3074F PR MOST RECENT SYSTOLIC BLOOD PRESSURE < 130 MM HG: ICD-10-PCS | Mod: CPTII,S$GLB,,

## 2022-09-18 PROCEDURE — 1159F PR MEDICATION LIST DOCUMENTED IN MEDICAL RECORD: ICD-10-PCS | Mod: CPTII,S$GLB,,

## 2022-09-18 PROCEDURE — 3008F BODY MASS INDEX DOCD: CPT | Mod: CPTII,S$GLB,,

## 2022-09-18 PROCEDURE — 3078F DIAST BP <80 MM HG: CPT | Mod: CPTII,S$GLB,,

## 2022-09-18 RX ORDER — ALBUTEROL SULFATE 90 UG/1
2 AEROSOL, METERED RESPIRATORY (INHALATION) EVERY 6 HOURS PRN
Qty: 18 G | Refills: 0 | Status: SHIPPED | OUTPATIENT
Start: 2022-09-18

## 2022-09-18 RX ORDER — METHYLPREDNISOLONE 4 MG/1
TABLET ORAL
Qty: 21 TABLET | Refills: 0 | Status: SHIPPED | OUTPATIENT
Start: 2022-09-18 | End: 2022-09-18

## 2022-09-18 RX ORDER — METHYLPREDNISOLONE 4 MG/1
TABLET ORAL
Qty: 21 TABLET | Refills: 0 | Status: SHIPPED | OUTPATIENT
Start: 2022-09-18 | End: 2022-10-09

## 2022-09-18 RX ORDER — ALBUTEROL SULFATE 90 UG/1
2 AEROSOL, METERED RESPIRATORY (INHALATION) EVERY 4 HOURS PRN
Qty: 18 G | Refills: 1 | Status: SHIPPED | OUTPATIENT
Start: 2022-09-18 | End: 2022-09-18 | Stop reason: DRUGHIGH

## 2022-09-18 RX ORDER — ALBUTEROL SULFATE 90 UG/1
2 AEROSOL, METERED RESPIRATORY (INHALATION) EVERY 6 HOURS PRN
Qty: 18 G | Refills: 0 | Status: SHIPPED | OUTPATIENT
Start: 2022-09-18 | End: 2022-09-18

## 2022-09-18 NOTE — PROGRESS NOTES
"Subjective:       Patient ID: Cory Prather is a 52 y.o. male.    Vitals:  height is 5' 7" (1.702 m) and weight is 86.2 kg (190 lb). His temperature is 98.7 °F (37.1 °C). His blood pressure is 129/79 and his pulse is 94. His respiration is 20 and oxygen saturation is 97%.     Chief Complaint: Shortness of Breath    Cory Prather is a 52 y.o. male with hx of COPD who presents for SOB which onset this morning. Patient reports episode started after walking down a flight of stairs.  Associated sxs include wheezing. Patient reports when he got to Urgent Care he felt like his breathing got significantly better. Patient denies increase in sputum production or sputum volume. Patient denies any fever, chills, CP, n/v/d, congestion, numbness/tingling, or leg swelling. He reports a similar episode last year. He does not currently see a Pulmonologist.     Current smoker. Patient reports he has now cut back to 0.5 ppd    Shortness of Breath  This is a new problem. The current episode started today. The problem occurs constantly. The problem has been unchanged. Associated symptoms include wheezing. Pertinent negatives include no chest pain, ear pain, fever, headaches, sore throat, sputum production or vomiting. The symptoms are aggravated by any activity. He has tried nothing for the symptoms. His past medical history is significant for COPD.     Constitution: Negative for chills, sweating and fever.   HENT:  Negative for ear pain, congestion and sore throat.    Cardiovascular:  Negative for chest pain.   Respiratory:  Positive for cough, COPD, shortness of breath and wheezing. Negative for chest tightness and sputum production.    Gastrointestinal:  Negative for nausea, vomiting and diarrhea.   Neurological:  Negative for dizziness, headaches, numbness and tingling.     Objective:      Physical Exam   Constitutional: He is oriented to person, place, and time. He appears well-developed. He is cooperative.  Non-toxic " appearance. He does not appear ill. No distress.   HENT:   Head: Normocephalic and atraumatic.   Ears:   Right Ear: Hearing and external ear normal.   Left Ear: Hearing and external ear normal.   Nose: Nose normal. No mucosal edema, rhinorrhea or nasal deformity. No epistaxis. Right sinus exhibits no maxillary sinus tenderness and no frontal sinus tenderness. Left sinus exhibits no maxillary sinus tenderness and no frontal sinus tenderness.   Mouth/Throat: Uvula is midline, oropharynx is clear and moist and mucous membranes are normal. No trismus in the jaw. Normal dentition. No uvula swelling. No oropharyngeal exudate, posterior oropharyngeal edema or posterior oropharyngeal erythema.   Eyes: Conjunctivae and lids are normal. No scleral icterus. Extraocular movement intact   Neck: Trachea normal and phonation normal. Neck supple. No edema present. No erythema present. No neck rigidity present.   Cardiovascular: Normal rate, regular rhythm, normal heart sounds and normal pulses.   No murmur heard.Exam reveals no gallop and no friction rub.   Pulmonary/Chest: Effort normal and breath sounds normal. No stridor. No respiratory distress. He has no decreased breath sounds. He has no wheezes. He has no rhonchi. He exhibits no tenderness.         Comments: No respiratory distress. Clear to auscultation. No wheezes, stridor, or rhonchi.     Abdominal: Normal appearance.   Musculoskeletal: Normal range of motion.         General: No deformity. Normal range of motion.   Neurological: He is alert and oriented to person, place, and time. He exhibits normal muscle tone. Coordination normal.   Skin: Skin is warm, dry, intact, not diaphoretic and not pale.   Psychiatric: His speech is normal and behavior is normal. Judgment and thought content normal.   Nursing note and vitals reviewed.      Assessment:       1. COPD exacerbation    2. COPD (chronic obstructive pulmonary disease) with acute bronchitis          Patient denied CXR  or nebulizer tx here in clinic today.   Plan:         COPD exacerbation  -     Discontinue: albuterol (PROVENTIL/VENTOLIN HFA) 90 mcg/actuation inhaler; Inhale 2 puffs into the lungs every 4 (four) hours as needed for Wheezing. Rescue  Dispense: 18 g; Refill: 1  -     Ambulatory referral/consult to Pulmonology  -     Discontinue: albuterol (VENTOLIN HFA) 90 mcg/actuation inhaler; Inhale 2 puffs into the lungs every 6 (six) hours as needed for Shortness of Breath. Rescue  Dispense: 18 g; Refill: 0  -     albuterol (VENTOLIN HFA) 90 mcg/actuation inhaler; Inhale 2 puffs into the lungs every 6 (six) hours as needed for Shortness of Breath. Rescue  Dispense: 18 g; Refill: 0    COPD (chronic obstructive pulmonary disease) with acute bronchitis  -     Discontinue: albuterol (PROVENTIL/VENTOLIN HFA) 90 mcg/actuation inhaler; Inhale 2 puffs into the lungs every 4 (four) hours as needed for Wheezing. Rescue  Dispense: 18 g; Refill: 1  -     Ambulatory referral/consult to Pulmonology  -     Discontinue: albuterol (VENTOLIN HFA) 90 mcg/actuation inhaler; Inhale 2 puffs into the lungs every 6 (six) hours as needed for Shortness of Breath. Rescue  Dispense: 18 g; Refill: 0  -     albuterol (VENTOLIN HFA) 90 mcg/actuation inhaler; Inhale 2 puffs into the lungs every 6 (six) hours as needed for Shortness of Breath. Rescue  Dispense: 18 g; Refill: 0    Other orders  -     Discontinue: methylPREDNISolone (MEDROL DOSEPACK) 4 mg tablet; use as directed  Dispense: 21 tablet; Refill: 0  -     methylPREDNISolone (MEDROL DOSEPACK) 4 mg tablet; use as directed  Dispense: 21 tablet; Refill: 0      Discussed with patient all pertinent information and results. Discussed patient diagnosis and plan of treatment. Patient was given all f/u and return instructions. All questions and concerns were addressed at this time. Patient expresses understanding of information and instructions, and is comfortable with plan.    Patient was instructed to  return to clinic or go to ED immediately for any worsening or change in current symptoms.         Medical Decision Making:   Urgent Care Management:  Patient has hx of COPD. Has had similar episode 1 year ago. He f/u with Pulmonary at that time but is not currently followed. He denies any long-term tx management. Patient denies CXR or nebulizer treatment here in UC today. Vitals reviewed. Patient feels significantly better and is not SOB. He is in no acute distress. Will discharge patient with albuterol inhaler and course of oral steroids. Referral placed to Pulmonology for f/u and care establishment.   Additional MDM:     Heart Failure Score:   Systolic BP = 120-129  Heart Rate = 90-94  Sodium = 138  COPD = Yes  Black = No  BUN = <9  Age = 50-59  Patient has a GWTG HF Risk Score for In-Hospital Mortality of 36 which translates into a probability of death of 1-5% (Low Risk).

## 2023-04-21 ENCOUNTER — PATIENT MESSAGE (OUTPATIENT)
Dept: ADMINISTRATIVE | Facility: HOSPITAL | Age: 53
End: 2023-04-21
Payer: COMMERCIAL

## 2024-03-20 ENCOUNTER — PATIENT OUTREACH (OUTPATIENT)
Dept: ADMINISTRATIVE | Facility: HOSPITAL | Age: 54
End: 2024-03-20
Payer: COMMERCIAL

## 2024-03-20 NOTE — LETTER
March 20, 2024    Cory Prather  [address]  [address]  [place] [address]             [facility]  Jae Pappas, DO   [address] [facility]  Phone: [phone]  Fax: [phone]   Dear Mr. Prather:      We at [facility] care about your health, and we noticed it has been over a year since you have had your last annual physical exam. To help you get the most out of each of your visits, we will review your information to make sure you are up to date on all of your recommended tests and/or procedures.    We have Dr. Pappas listed as your primary care provider. If Dr. Pappas is no longer your primary care provider, please contact us so that we may update our records accordingly.    At your earliest convenience, please call our clinic at [phone] to schedule an appointment, or you can schedule an appointment via the [facility] website. We look forward to seeing you.     If you have any questions or concerns, please don't hesitate to call.    Sincerely,    Your Primary Care Team

## 2024-10-01 ENCOUNTER — PATIENT MESSAGE (OUTPATIENT)
Dept: INTERNAL MEDICINE | Facility: CLINIC | Age: 54
End: 2024-10-01
Payer: COMMERCIAL